# Patient Record
Sex: FEMALE | Race: WHITE | NOT HISPANIC OR LATINO | Employment: OTHER | URBAN - METROPOLITAN AREA
[De-identification: names, ages, dates, MRNs, and addresses within clinical notes are randomized per-mention and may not be internally consistent; named-entity substitution may affect disease eponyms.]

---

## 2023-02-18 PROBLEM — N90.9 NONINFLAMMATORY DISORDER OF VULVA OR PERINEUM: Status: ACTIVE | Noted: 2020-09-29

## 2023-02-18 PROBLEM — R73.01 IMPAIRED FASTING GLUCOSE: Status: ACTIVE | Noted: 2020-03-30

## 2023-02-18 PROBLEM — R51.9 HEADACHE: Status: ACTIVE | Noted: 2021-03-24

## 2023-02-18 PROBLEM — E66.01 MORBID OBESITY (MULTI): Status: ACTIVE | Noted: 2019-04-04

## 2023-02-18 PROBLEM — M19.91 LOCALIZED, PRIMARY OSTEOARTHRITIS: Status: ACTIVE | Noted: 2019-04-04

## 2023-02-18 PROBLEM — E05.90 THYROTOXICOSIS: Status: ACTIVE | Noted: 2019-04-04

## 2023-02-18 PROBLEM — K21.9 GASTROESOPHAGEAL REFLUX DISEASE: Status: ACTIVE | Noted: 2019-04-04

## 2023-02-18 PROBLEM — B35.6 TINEA CRURIS: Status: ACTIVE | Noted: 2020-09-29

## 2023-02-18 RX ORDER — BETAMETHASONE VALERATE 1 MG/G
CREAM TOPICAL DAILY PRN
COMMUNITY
Start: 2019-10-03

## 2023-02-18 RX ORDER — FAMOTIDINE 40 MG/1
1 TABLET, FILM COATED ORAL DAILY
COMMUNITY
Start: 2014-09-08 | End: 2023-03-21 | Stop reason: SDUPTHER

## 2023-02-18 RX ORDER — ASPIRIN 81 MG/1
1 TABLET ORAL DAILY
COMMUNITY
Start: 2020-09-29

## 2023-02-18 RX ORDER — LISINOPRIL 40 MG/1
1 TABLET ORAL DAILY
COMMUNITY
Start: 2006-03-01 | End: 2023-03-21 | Stop reason: SDUPTHER

## 2023-02-18 RX ORDER — POTASSIUM CHLORIDE 1500 MG/1
1 TABLET, EXTENDED RELEASE ORAL 2 TIMES DAILY
COMMUNITY
Start: 2019-10-03 | End: 2023-03-21 | Stop reason: SDUPTHER

## 2023-02-18 RX ORDER — CICLOPIROX OLAMINE 7.7 MG/G
CREAM TOPICAL 2 TIMES DAILY
COMMUNITY
Start: 2020-09-29 | End: 2023-03-21 | Stop reason: WASHOUT

## 2023-02-18 RX ORDER — METHIMAZOLE 10 MG/1
0.5 TABLET ORAL
COMMUNITY
Start: 2013-10-08 | End: 2023-03-21 | Stop reason: SDUPTHER

## 2023-02-18 RX ORDER — NYSTATIN 100000 [USP'U]/G
POWDER TOPICAL 3 TIMES DAILY
COMMUNITY
Start: 2017-10-09 | End: 2023-03-21 | Stop reason: WASHOUT

## 2023-02-18 RX ORDER — TOLNAFTATE 10 MG/G
CREAM TOPICAL 2 TIMES DAILY
COMMUNITY
Start: 2020-10-28 | End: 2023-03-21 | Stop reason: WASHOUT

## 2023-02-18 RX ORDER — HYDROCHLOROTHIAZIDE 25 MG/1
2 TABLET ORAL DAILY
COMMUNITY
Start: 2006-03-01 | End: 2023-03-21 | Stop reason: SDUPTHER

## 2023-02-18 RX ORDER — CYANOCOBALAMIN (VITAMIN B-12) 500 MCG
1 TABLET ORAL DAILY
COMMUNITY
Start: 2010-10-04

## 2023-02-18 RX ORDER — FLAXSEED
POWDER (GRAM) ORAL DAILY
COMMUNITY
Start: 2016-03-21 | End: 2023-03-21 | Stop reason: WASHOUT

## 2023-03-10 ENCOUNTER — TELEPHONE (OUTPATIENT)
Dept: PRIMARY CARE | Facility: CLINIC | Age: 83
End: 2023-03-10
Payer: MEDICARE

## 2023-03-10 DIAGNOSIS — E78.2 MIXED HYPERLIPIDEMIA: Primary | ICD-10-CM

## 2023-03-10 DIAGNOSIS — I10 ESSENTIAL HYPERTENSION: ICD-10-CM

## 2023-03-10 DIAGNOSIS — R73.01 IMPAIRED FASTING GLUCOSE: ICD-10-CM

## 2023-03-10 NOTE — TELEPHONE ENCOUNTER
Pt states her and her  would like to go have their routine BW done on Monday but there are no orders in Epic or [employer]. Please place order in Epic and we can inform pt. Thanks, CG

## 2023-03-14 ENCOUNTER — LAB (OUTPATIENT)
Dept: LAB | Facility: LAB | Age: 83
End: 2023-03-14
Payer: MEDICARE

## 2023-03-14 DIAGNOSIS — R73.01 IMPAIRED FASTING GLUCOSE: ICD-10-CM

## 2023-03-14 DIAGNOSIS — E78.2 MIXED HYPERLIPIDEMIA: ICD-10-CM

## 2023-03-14 DIAGNOSIS — I10 ESSENTIAL HYPERTENSION: ICD-10-CM

## 2023-03-14 LAB
ANION GAP IN SER/PLAS: 11 MMOL/L (ref 10–20)
CALCIUM (MG/DL) IN SER/PLAS: 9.1 MG/DL (ref 8.6–10.3)
CARBON DIOXIDE, TOTAL (MMOL/L) IN SER/PLAS: 29 MMOL/L (ref 21–32)
CHLORIDE (MMOL/L) IN SER/PLAS: 105 MMOL/L (ref 98–107)
CHOLESTEROL (MG/DL) IN SER/PLAS: 167 MG/DL (ref 0–199)
CHOLESTEROL IN HDL (MG/DL) IN SER/PLAS: 51.7 MG/DL
CHOLESTEROL/HDL RATIO: 3.2
CREATININE (MG/DL) IN SER/PLAS: 0.96 MG/DL (ref 0.5–1.05)
GFR FEMALE: 59 ML/MIN/1.73M2
GLUCOSE (MG/DL) IN SER/PLAS: 88 MG/DL (ref 74–99)
LDL: 102 MG/DL (ref 0–99)
POTASSIUM (MMOL/L) IN SER/PLAS: 4.1 MMOL/L (ref 3.5–5.3)
SODIUM (MMOL/L) IN SER/PLAS: 141 MMOL/L (ref 136–145)
TRIGLYCERIDE (MG/DL) IN SER/PLAS: 69 MG/DL (ref 0–149)
UREA NITROGEN (MG/DL) IN SER/PLAS: 16 MG/DL (ref 6–23)
VLDL: 14 MG/DL (ref 0–40)

## 2023-03-14 PROCEDURE — 36415 COLL VENOUS BLD VENIPUNCTURE: CPT

## 2023-03-14 PROCEDURE — 83036 HEMOGLOBIN GLYCOSYLATED A1C: CPT

## 2023-03-14 PROCEDURE — 80061 LIPID PANEL: CPT

## 2023-03-14 PROCEDURE — 80048 BASIC METABOLIC PNL TOTAL CA: CPT

## 2023-03-15 LAB
ESTIMATED AVERAGE GLUCOSE FOR HBA1C: 114 MG/DL
HEMOGLOBIN A1C/HEMOGLOBIN TOTAL IN BLOOD: 5.6 %

## 2023-03-21 ENCOUNTER — OFFICE VISIT (OUTPATIENT)
Dept: PRIMARY CARE | Facility: CLINIC | Age: 83
End: 2023-03-21
Payer: MEDICARE

## 2023-03-21 VITALS
WEIGHT: 201 LBS | SYSTOLIC BLOOD PRESSURE: 126 MMHG | TEMPERATURE: 97.8 F | DIASTOLIC BLOOD PRESSURE: 84 MMHG | OXYGEN SATURATION: 96 % | HEART RATE: 67 BPM

## 2023-03-21 DIAGNOSIS — K21.9 GASTROESOPHAGEAL REFLUX DISEASE WITHOUT ESOPHAGITIS: Primary | ICD-10-CM

## 2023-03-21 DIAGNOSIS — I10 BENIGN ESSENTIAL HYPERTENSION: ICD-10-CM

## 2023-03-21 DIAGNOSIS — E05.90 THYROTOXICOSIS WITHOUT THYROID STORM, UNSPECIFIED THYROTOXICOSIS TYPE: ICD-10-CM

## 2023-03-21 PROCEDURE — 1036F TOBACCO NON-USER: CPT | Performed by: FAMILY MEDICINE

## 2023-03-21 PROCEDURE — 3079F DIAST BP 80-89 MM HG: CPT | Performed by: FAMILY MEDICINE

## 2023-03-21 PROCEDURE — 3074F SYST BP LT 130 MM HG: CPT | Performed by: FAMILY MEDICINE

## 2023-03-21 PROCEDURE — 1159F MED LIST DOCD IN RCRD: CPT | Performed by: FAMILY MEDICINE

## 2023-03-21 PROCEDURE — 99214 OFFICE O/P EST MOD 30 MIN: CPT | Performed by: FAMILY MEDICINE

## 2023-03-21 RX ORDER — FAMOTIDINE 40 MG/1
40 TABLET, FILM COATED ORAL DAILY
Qty: 90 TABLET | Refills: 1 | Status: SHIPPED | OUTPATIENT
Start: 2023-03-21 | End: 2023-09-20 | Stop reason: SDUPTHER

## 2023-03-21 RX ORDER — METHIMAZOLE 10 MG/1
TABLET ORAL
Qty: 60 TABLET | Refills: 3 | Status: SHIPPED | OUTPATIENT
Start: 2023-03-21 | End: 2023-09-20 | Stop reason: SDUPTHER

## 2023-03-21 RX ORDER — HYDROCHLOROTHIAZIDE 25 MG/1
50 TABLET ORAL DAILY
Qty: 180 TABLET | Refills: 1 | Status: SHIPPED | OUTPATIENT
Start: 2023-03-21 | End: 2023-09-20 | Stop reason: SDUPTHER

## 2023-03-21 RX ORDER — LISINOPRIL 40 MG/1
40 TABLET ORAL DAILY
Qty: 90 TABLET | Refills: 1 | Status: SHIPPED | OUTPATIENT
Start: 2023-03-21 | End: 2023-09-20 | Stop reason: SDUPTHER

## 2023-03-21 RX ORDER — POTASSIUM CHLORIDE 1500 MG/1
20 TABLET, EXTENDED RELEASE ORAL 2 TIMES DAILY
Qty: 180 TABLET | Refills: 1 | Status: SHIPPED | OUTPATIENT
Start: 2023-03-21 | End: 2023-09-20 | Stop reason: SDUPTHER

## 2023-03-21 ASSESSMENT — PATIENT HEALTH QUESTIONNAIRE - PHQ9
2. FEELING DOWN, DEPRESSED OR HOPELESS: NOT AT ALL
SUM OF ALL RESPONSES TO PHQ9 QUESTIONS 1 AND 2: 0
1. LITTLE INTEREST OR PLEASURE IN DOING THINGS: NOT AT ALL

## 2023-03-21 NOTE — PATIENT INSTRUCTIONS
Chronic conditions controlled  Encouraged to continue eating healthy and exercising daily   Medications were reviewed and refilled   Discussed the following  Medicare visit in 1 month, labs done before 6-month appointment  Staying physically active, stay hydrated  Follow up in 1 month

## 2023-03-21 NOTE — PROGRESS NOTES
Assessment     ASSESSMENT/PLAN:      Problem List Items Addressed This Visit          Circulatory    Benign essential hypertension    Relevant Medications    hydroCHLOROthiazide (HYDRODiuril) 25 mg tablet    lisinopril 40 mg tablet    potassium chloride CR (K-Tab) 20 mEq ER tablet    Other Relevant Orders    Basic metabolic panel       Digestive    Gastroesophageal reflux disease - Primary    Relevant Medications    famotidine (Pepcid) 40 mg tablet       Endocrine/Metabolic    Thyrotoxicosis    Relevant Medications    methIMAzole (Tapazole) 10 mg tablet    Other Relevant Orders    Tsh With Reflex To Free T4 If Abnormal       Patient Instructions:  Patient Instructions   Chronic conditions controlled  Encouraged to continue eating healthy and exercising daily   Medications were reviewed and refilled   Discussed the following  Medicare visit in 1 month, labs done before 6-month appointment  Staying physically active  Follow up in 1 month      Signed by: Ysabel Funk DO       FUTURE DIRECTION:   Obesity: Encourage exercising  CKD stage IIb: Monitor kidney function, encourage hydration  Colitis: Continue diet modification, may need budesonide if having acute flare, may need to follow-up with GI specialist    Subjective   SUBJECTIVE:     HPI : Patient is a 82 y.o. female who presents today for the following:     HTN: Lisinopril 40 mg, chlorothiazide 25 mg  Hypokalemia: Potassium 20 mEq  Hypothyroidism: Methimazole 10 mg  DJD: Meloxicam 15 mg, Tylenol  GERD: Famotidine 40 mg  Colitis: has been seen by GI, improved with budesonide, trying to adhere intolerant to lactose  - direct controll    Preventative  Colonoscopy: Last colonoscopy January 2022, Dr Catherine, next one in 5 year.2027  Vaccine: Pneumovax 2009, Shingrix 2020, UTD on COVID  Bone density: Last DEXA scan 11/2020    Care team  Dermatology:   GI: Diane MEEKS    Chronic conditions controlled  Encouraged to continue eating healthy and exercising daily    Medications were reviewed and refilled   Discussed the following  Obesity: Encourage chair exercises, diet modifications  Follow up in 6 months       Review of Systems   All other systems reviewed and are negative.      No past medical history on file.     No past surgical history on file.     Current Outpatient Medications   Medication Instructions    aspirin 81 mg EC tablet 1 tablet, oral, Daily, Enteric    betamethasone valerate (Valisone) 0.1 % cream Topical, Daily PRN, Apply to affected area sparingly    cholecalciferol (Vitamin D-3) 10 MCG (400 UNIT) tablet 1 tablet, oral, Daily    famotidine (PEPCID) 40 mg, oral, Daily    hydroCHLOROthiazide (HYDRODIURIL) 50 mg, oral, Daily    lisinopril 40 mg, oral, Daily, (Patient will call when needed)    methIMAzole (Tapazole) 10 mg tablet Take half tab S,m,w,thurs,f<BR>Pt will call when needed    NON FORMULARY QC Tumeric Complex 500 MG oral capsules<BR>Take 2 pills in the morning and 2 pill in the evening    potassium chloride CR (K-Tab) 20 mEq ER tablet 20 mEq, oral, 2 times daily        No Known Allergies     Social History     Socioeconomic History    Marital status:      Spouse name: Not on file    Number of children: Not on file    Years of education: Not on file    Highest education level: Not on file   Occupational History    Not on file   Tobacco Use    Smoking status: Never    Smokeless tobacco: Never   Vaping Use    Vaping status: Not on file   Substance and Sexual Activity    Alcohol use: Not on file    Drug use: Not on file    Sexual activity: Not on file   Other Topics Concern    Not on file   Social History Narrative    Not on file     Social Determinants of Health     Financial Resource Strain: Not on file   Food Insecurity: Not on file   Transportation Needs: Not on file   Physical Activity: Not on file   Stress: Not on file   Social Connections: Not on file   Intimate Partner Violence: Not on file   Housing Stability: Not on file        Family  History   Problem Relation Name Age of Onset    Hypertension Mother      Liver cancer Father      Liver disease Father      Coronary artery disease Brother          CABG 55    Liver disease Son      Coronary artery disease Paternal Grandfather          MI 47        Objective     OBJECTIVE:     Vitals:    03/21/23 0802   BP: 126/84   Pulse: 67   Temp: 36.6 °C (97.8 °F)   SpO2: 96%   Weight: 91.2 kg (201 lb)        Physical Exam  HENT:      Head: Normocephalic and atraumatic.      Nose: Nose normal.      Mouth/Throat:      Mouth: Mucous membranes are moist.   Eyes:      Pupils: Pupils are equal, round, and reactive to light.   Cardiovascular:      Rate and Rhythm: Normal rate and regular rhythm.      Pulses: Normal pulses.      Heart sounds: No murmur heard.  Pulmonary:      Effort: Pulmonary effort is normal.      Breath sounds: Normal breath sounds. No wheezing.   Abdominal:      Tenderness: There is no abdominal tenderness.   Musculoskeletal:         General: Normal range of motion.      Cervical back: Normal range of motion.   Skin:     General: Skin is warm and dry.   Neurological:      Mental Status: She is alert.   Psychiatric:         Mood and Affect: Mood normal.

## 2023-05-16 ENCOUNTER — OFFICE VISIT (OUTPATIENT)
Dept: PRIMARY CARE | Facility: CLINIC | Age: 83
End: 2023-05-16
Payer: MEDICARE

## 2023-05-16 VITALS
HEIGHT: 60 IN | TEMPERATURE: 97.9 F | OXYGEN SATURATION: 98 % | HEART RATE: 67 BPM | WEIGHT: 205 LBS | BODY MASS INDEX: 40.25 KG/M2 | SYSTOLIC BLOOD PRESSURE: 112 MMHG | DIASTOLIC BLOOD PRESSURE: 68 MMHG

## 2023-05-16 DIAGNOSIS — Z13.89 ENCOUNTER FOR SCREENING FOR OTHER DISORDER: ICD-10-CM

## 2023-05-16 DIAGNOSIS — Z78.0 ENCOUNTER FOR OSTEOPOROSIS SCREENING IN ASYMPTOMATIC POSTMENOPAUSAL PATIENT: ICD-10-CM

## 2023-05-16 DIAGNOSIS — Z00.00 ENCOUNTER FOR ANNUAL WELLNESS EXAM IN MEDICARE PATIENT: Primary | ICD-10-CM

## 2023-05-16 DIAGNOSIS — Z13.820 SCREENING FOR OSTEOPOROSIS: ICD-10-CM

## 2023-05-16 DIAGNOSIS — E78.2 MIXED HYPERLIPIDEMIA: ICD-10-CM

## 2023-05-16 DIAGNOSIS — Z13.820 ENCOUNTER FOR OSTEOPOROSIS SCREENING IN ASYMPTOMATIC POSTMENOPAUSAL PATIENT: ICD-10-CM

## 2023-05-16 PROCEDURE — G0439 PPPS, SUBSEQ VISIT: HCPCS | Performed by: FAMILY MEDICINE

## 2023-05-16 PROCEDURE — 3078F DIAST BP <80 MM HG: CPT | Performed by: FAMILY MEDICINE

## 2023-05-16 PROCEDURE — 1170F FXNL STATUS ASSESSED: CPT | Performed by: FAMILY MEDICINE

## 2023-05-16 PROCEDURE — G0444 DEPRESSION SCREEN ANNUAL: HCPCS | Performed by: FAMILY MEDICINE

## 2023-05-16 PROCEDURE — G0446 INTENS BEHAVE THER CARDIO DX: HCPCS | Performed by: FAMILY MEDICINE

## 2023-05-16 PROCEDURE — 3074F SYST BP LT 130 MM HG: CPT | Performed by: FAMILY MEDICINE

## 2023-05-16 PROCEDURE — 1160F RVW MEDS BY RX/DR IN RCRD: CPT | Performed by: FAMILY MEDICINE

## 2023-05-16 PROCEDURE — 1036F TOBACCO NON-USER: CPT | Performed by: FAMILY MEDICINE

## 2023-05-16 PROCEDURE — 1159F MED LIST DOCD IN RCRD: CPT | Performed by: FAMILY MEDICINE

## 2023-05-16 RX ORDER — UBIDECARENONE 75 MG
500 CAPSULE ORAL DAILY
COMMUNITY

## 2023-05-16 ASSESSMENT — MINI MENTAL STATE EXAM
SHOW: PENCIL [OBJECT] ASK: WHAT IS THIS CALLED?: 2 CORRECT
SPELL THE WORD WORLD FORWARD AND BACKWARDS OR SERIAL 7S: 5 CORRECT
RECALL THE 3 OBJECTS FROM ABOVE (APPLE, TABLE, PENNY) OR (BALL, TREE, FLAG): 2 CORRECT
WHAT STATE, COUNTRY, CITY, HOSPITAL, FLOOR: 5 CORRECT
NAME OR REPEAT 3 OBJECTS - (APPLE, TABLE, PENNY) OR (BALL, TREE, FLAG): 3 CORRECT
SAY: I WOULD LIKE YOU TO REPEAT THIS PHRASE AFTER ME: NO IFS, ANDS, OR BUTS.: 1 CORRECT
HAND THE PERSON A PENCIL AND PAPER. SAY:  WRITE ANY COMPLETE SENTENCE ON THAT PIECE OF PAPER. (NOTE: THE SENTENCE MUST MAKE SENSE.  IGNORE SPELLING ERRORS): 1 CORRECT
SUM ALL MMSE QUESTIONS FOR TOTAL SCORE [OUT OF 30].: 29
SAY:  READ THE WORDS ON THE PAGE AND THEN DO WHAT IT SAYS.  THEN HAND THE PERSON THE SHEET WITH CLOSE YOUR EYES ON IT.  IF THE SUBJECT READS AND DOES NOT CLOSE THEIR EYES, REPEAT UP TO THREE TIMES.  SCORE ONLY IF SUBJECT CLOSES EYES.: 3 CORRECT
WHAT IS THE YEAR, SEASON, DATE, DAY, AND MONTH: 5 CORRECT
PLEASE COPY THIS PICTURE (NOTE ALL 10 ANGLES MUST BE PRESENT AND TWO MUST INTERSECT): 1 CORRECT
PLACE DESIGN, ERASER AND PENCIL IN FRONT OF THE PERSON.  SAY:  COPY THIS DESIGN PLEASE.  SHOW: DESIGN. ALLOW: MULTIPLE TRIES. WAIT UNTIL PERSON IS FINISHED AND HANDS IT BACK. SCORE: ONLY FOR DIAGRAM WITH 4-SIDED FIGURE BETWEEN TWO 5-SIDED FIGURES: 1 CORRECT

## 2023-05-16 ASSESSMENT — ACTIVITIES OF DAILY LIVING (ADL)
GROCERY_SHOPPING: INDEPENDENT
BATHING: INDEPENDENT
DRESSING: INDEPENDENT
TAKING_MEDICATION: INDEPENDENT
DOING_HOUSEWORK: NEEDS ASSISTANCE
MANAGING_FINANCES: INDEPENDENT

## 2023-05-16 ASSESSMENT — PATIENT HEALTH QUESTIONNAIRE - PHQ9
SUM OF ALL RESPONSES TO PHQ9 QUESTIONS 1 AND 2: 0
1. LITTLE INTEREST OR PLEASURE IN DOING THINGS: NOT AT ALL
1. LITTLE INTEREST OR PLEASURE IN DOING THINGS: NOT AT ALL
2. FEELING DOWN, DEPRESSED OR HOPELESS: NOT AT ALL
SUM OF ALL RESPONSES TO PHQ9 QUESTIONS 1 AND 2: 0
2. FEELING DOWN, DEPRESSED OR HOPELESS: NOT AT ALL

## 2023-05-16 NOTE — PROGRESS NOTES
"   05/16/23 0900   MMSE (Mini Mental State Exam)   What is the Year, Season, Date, Day, and Month? 5   Where are we: State, Country, City, Hospital, Floor? 5   Name / Repeat 3 objects:( Apple, Table, Geno) or (Ball, Tree, Flag)  3   Serial 7's backwards or spell World backwards? 5   Recall the 3 objects from above (apple, table, geno) or (ball, tree, flag) 2   Name the following: pencil, watch 2   Repeat the following: \"No ifs, ands, or buts.\" 1   Follow these commands: Take a paper in your right hand, fold it in half, and put it on the floor. 3   \"Please read this and do what it says\" (Written instruction is \"Close your eyes.\") 1   Written instruction is \"Make up and write a sentence about anything.\" (This sentence must contain a noun and a verb.) 1   Written instruction is: \"Please copy this picture.\" (All 10 angles must be present and two must intersect.) 1   Total Score: 29     "

## 2023-05-16 NOTE — PATIENT INSTRUCTIONS
Chronic conditions controlled  Encouraged to continue eating healthy and exercising daily   Medications were reviewed and refilled   Discussed the following  Follow-up with dermatology for annual skin exam  Follow up in 6 months

## 2023-05-16 NOTE — PROGRESS NOTES
Assessment/Plan    ASSESSMENT/PLAN:      Problem List Items Addressed This Visit          Other    Hyperlipidemia    Overview     Note: [facility]         Relevant Orders    Lipid panel     Other Visit Diagnoses       Encounter for annual wellness exam in Medicare patient    -  Primary    Screening for osteoporosis        Relevant Orders    XR DEXA bone density    Encounter for osteoporosis screening in asymptomatic postmenopausal patient        Relevant Orders    XR DEXA bone density    Encounter for screening for other disorder [Z13.89 (ICD-10-CM)]                Patient Instructions:   Patient Instructions   Chronic conditions controlled  Encouraged to continue eating healthy and exercising daily   Medications were reviewed and refilled   Discussed the following  Follow-up with dermatology for annual skin exam  Follow up in 6 months      FUTURE DIRECTION:       Subjective   SUBJECTIVE:     Reason for Visit: Amena Rubio is an 82 y.o. female here for a Medicare Wellness visit.     HTN: Lisinopril 40 mg, chlorothiazide 25 mg  Hypokalemia: Potassium 20 mEq  Hypothyroidism: Methimazole 10 mg  DJD: Meloxicam 15 mg, Tylenol  GERD: Famotidine 40 mg  Colitis: has been seen by GI, improved with budesonide, trying to adhere intolerant to lactose    Preventative  Colonoscopy: Last colonoscopy January 2022, Dr Catherine, next one in 5 year.2027  Vaccine: Pneumovax 2009, Shingrix 2020, UTD on COVID  Bone density: Last DEXA scan 11/2020    Care team  Dermatology:   GI: Diane MEEKS  Past Medical, Surgical, and Family History reviewed and updated in chart.    Reviewed all medications by prescribing practitioner or clinical pharmacist (such as prescriptions, OTCs, herbal therapies and supplements) and documented in the medical record.      Patient Care Team:  Ysabel Funk DO as PCP - General  Ysabel Funk DO as PCP - [employer] PCP     Review of Systems    Objective   OBJECTIVE:     Vitals:  BP  112/68   Pulse 67   Temp 36.6 °C (97.9 °F) (Temporal)   Ht 1.524 m (5')   Wt 93 kg (205 lb)   SpO2 98%   BMI 40.04 kg/m²       Physical Exam                Cardiovascular risk discussed and, if needed, lifestyle modifications recommended, including nutritional choices, exercise, and elimination of habits contributing to risk.  We agreed on a plan to reduce her current cardiovascular risk.   Aspirin use/disuse was discussed after reviewing updated guidelines. Spent 15 minutes

## 2023-06-20 ENCOUNTER — TELEPHONE (OUTPATIENT)
Dept: PRIMARY CARE | Facility: CLINIC | Age: 83
End: 2023-06-20
Payer: MEDICARE

## 2023-06-23 NOTE — TELEPHONE ENCOUNTER
----- Message from Ysabel Funk DO sent at 6/23/2023 11:22 AM EDT -----  Please let pt know that DEXA scan was normal

## 2023-08-09 ENCOUNTER — OFFICE VISIT (OUTPATIENT)
Dept: PRIMARY CARE | Facility: CLINIC | Age: 83
End: 2023-08-09
Payer: MEDICARE

## 2023-08-09 VITALS
OXYGEN SATURATION: 96 % | WEIGHT: 199 LBS | DIASTOLIC BLOOD PRESSURE: 76 MMHG | SYSTOLIC BLOOD PRESSURE: 122 MMHG | TEMPERATURE: 97 F | HEART RATE: 71 BPM | BODY MASS INDEX: 38.86 KG/M2

## 2023-08-09 DIAGNOSIS — K59.09 OTHER CONSTIPATION: Primary | ICD-10-CM

## 2023-08-09 PROCEDURE — 1160F RVW MEDS BY RX/DR IN RCRD: CPT | Performed by: FAMILY MEDICINE

## 2023-08-09 PROCEDURE — 3078F DIAST BP <80 MM HG: CPT | Performed by: FAMILY MEDICINE

## 2023-08-09 PROCEDURE — 99213 OFFICE O/P EST LOW 20 MIN: CPT | Performed by: FAMILY MEDICINE

## 2023-08-09 PROCEDURE — 3074F SYST BP LT 130 MM HG: CPT | Performed by: FAMILY MEDICINE

## 2023-08-09 PROCEDURE — 1159F MED LIST DOCD IN RCRD: CPT | Performed by: FAMILY MEDICINE

## 2023-08-09 PROCEDURE — 1036F TOBACCO NON-USER: CPT | Performed by: FAMILY MEDICINE

## 2023-08-09 RX ORDER — POLYETHYLENE GLYCOL 3350 17 G/17G
17 POWDER, FOR SOLUTION ORAL DAILY
Qty: 30 PACKET | Refills: 0 | Status: SHIPPED | OUTPATIENT
Start: 2023-08-09 | End: 2023-09-08

## 2023-08-09 ASSESSMENT — ENCOUNTER SYMPTOMS: CONSTIPATION: 1

## 2023-08-09 NOTE — PATIENT INSTRUCTIONS
Constipation: Patient advised to try MiraLAX 17 g in the morning and evening until stool is soft.  If no improvement between days 4 and 5 patient advised to increase MiraLAX to 17 g 3 times a day.

## 2023-08-09 NOTE — PROGRESS NOTES
Assessment/Plan   ASSESSMENT/PLAN:      Amena was seen today for constipation.  Diagnoses and all orders for this visit:  Other constipation (Primary)  -     polyethylene glycol (Glycolax, Miralax) 17 gram packet; Take 17 g by mouth once daily. Mix 1 cap (17g) into 8 ounces of fluid.       Patient Instructions   Constipation: Patient advised to try MiraLAX 17 g in the morning and evening until stool is soft.  If no improvement between days 4 and 5 patient advised to increase MiraLAX to 17 g 3 times a day.    Ysabel Funk DO     FUTURE DIRECTION:     Subjective   SUBJECTIVE:     Patient ID: Amena Rubio is a 83 y.o. femalefor the following:    Constipation  Patient states that she has a history of colitis and usually experiences diarrhea but afterwards experiences constipation.  She denies abdominal pain nausea  Her last bowel movement was today but it was hard and small amount which caused increased straining      Review of Systems   Gastrointestinal:  Positive for constipation.       No Known Allergies      Current Outpatient Medications:     aspirin 81 mg EC tablet, Take 1 tablet (81 mg) by mouth once daily. Enteric, Disp: , Rfl:     betamethasone valerate (Valisone) 0.1 % cream, Apply topically once daily as needed. Apply to affected area sparingly, Disp: , Rfl:     cholecalciferol (Vitamin D-3) 10 MCG (400 UNIT) tablet, Take 1 tablet (10 mcg) by mouth once daily., Disp: , Rfl:     cyanocobalamin (Vitamin B-12) 500 mcg tablet, Take 1 tablet (500 mcg) by mouth once daily., Disp: , Rfl:     famotidine (Pepcid) 40 mg tablet, Take 1 tablet (40 mg) by mouth once daily., Disp: 90 tablet, Rfl: 1    hydroCHLOROthiazide (HYDRODiuril) 25 mg tablet, Take 2 tablets (50 mg) by mouth once daily., Disp: 180 tablet, Rfl: 1    lisinopril 40 mg tablet, Take 1 tablet (40 mg) by mouth once daily. (Patient will call when needed), Disp: 90 tablet, Rfl: 1    methIMAzole (Tapazole) 10 mg tablet, Take half tab S,m,w,thurs,f  Pt will call when needed, Disp: 60 tablet, Rfl: 3    NON FORMULARY, QC Tumeric Complex 500 MG oral capsules Take 2 pills in the morning and 2 pill in the evening, Disp: , Rfl:     potassium chloride CR (K-Tab) 20 mEq ER tablet, Take 1 tablet (20 mEq) by mouth in the morning and 1 tablet (20 mEq) before bedtime., Disp: 180 tablet, Rfl: 1    polyethylene glycol (Glycolax, Miralax) 17 gram packet, Take 17 g by mouth once daily. Mix 1 cap (17g) into 8 ounces of fluid., Disp: 30 packet, Rfl: 0     Patient Active Problem List   Diagnosis    Benign essential hypertension    Hyperlipidemia    Essential hypertension    Localized, primary osteoarthritis    Thyrotoxicosis    Gastroesophageal reflux disease    Morbid obesity (CMS/HCC)    Impaired fasting glucose    Tinea cruris    Noninflammatory disorder of vulva or perineum    Headache       Social History     Socioeconomic History    Marital status:      Spouse name: Not on file    Number of children: Not on file    Years of education: Not on file    Highest education level: Not on file   Occupational History    Not on file   Tobacco Use    Smoking status: Never    Smokeless tobacco: Never   Substance and Sexual Activity    Alcohol use: Not on file    Drug use: Not on file    Sexual activity: Not on file   Other Topics Concern    Not on file   Social History Narrative    Not on file     Social Determinants of Health     Financial Resource Strain: Not on file   Food Insecurity: Not on file   Transportation Needs: Not on file   Physical Activity: Not on file   Stress: Not on file   Social Connections: Not on file   Intimate Partner Violence: Not on file   Housing Stability: Not on file       Objective   OBJECTIVE:     Vitals:    08/09/23 1544   BP: 122/76   Pulse: 71   Temp: 36.1 °C (97 °F)   SpO2: 96%       Exam      Physical Exam  Constitutional:       Appearance: Normal appearance.   HENT:      Head: Normocephalic.   Pulmonary:      Effort: Pulmonary effort is  normal.   Abdominal:      Tenderness: There is no abdominal tenderness.   Musculoskeletal:      Cervical back: Normal range of motion.   Neurological:      Mental Status: She is alert.   Psychiatric:         Mood and Affect: Mood normal.

## 2023-08-28 DIAGNOSIS — I10 BENIGN ESSENTIAL HYPERTENSION: ICD-10-CM

## 2023-08-28 DIAGNOSIS — K21.9 GASTROESOPHAGEAL REFLUX DISEASE WITHOUT ESOPHAGITIS: ICD-10-CM

## 2023-08-29 RX ORDER — LISINOPRIL 40 MG/1
40 TABLET ORAL
Qty: 90 TABLET | Refills: 1 | OUTPATIENT
Start: 2023-08-29

## 2023-08-29 RX ORDER — HYDROCHLOROTHIAZIDE 25 MG/1
50 TABLET ORAL DAILY
Qty: 180 TABLET | Refills: 1 | OUTPATIENT
Start: 2023-08-29

## 2023-08-29 RX ORDER — FAMOTIDINE 40 MG/1
40 TABLET, FILM COATED ORAL DAILY
Qty: 90 TABLET | Refills: 1 | OUTPATIENT
Start: 2023-08-29

## 2023-08-31 RX ORDER — POTASSIUM CHLORIDE 20 MEQ/1
20 TABLET, EXTENDED RELEASE ORAL 2 TIMES DAILY
Qty: 180 TABLET | OUTPATIENT
Start: 2023-08-31

## 2023-09-11 ENCOUNTER — LAB (OUTPATIENT)
Dept: LAB | Facility: LAB | Age: 83
End: 2023-09-11
Payer: MEDICARE

## 2023-09-11 DIAGNOSIS — E05.90 THYROTOXICOSIS WITHOUT THYROID STORM, UNSPECIFIED THYROTOXICOSIS TYPE: ICD-10-CM

## 2023-09-11 DIAGNOSIS — I10 BENIGN ESSENTIAL HYPERTENSION: ICD-10-CM

## 2023-09-11 DIAGNOSIS — E78.2 MIXED HYPERLIPIDEMIA: Primary | ICD-10-CM

## 2023-09-11 DIAGNOSIS — E78.2 MIXED HYPERLIPIDEMIA: ICD-10-CM

## 2023-09-11 LAB
ANION GAP IN SER/PLAS: 10 MMOL/L (ref 10–20)
CALCIUM (MG/DL) IN SER/PLAS: 9.1 MG/DL (ref 8.6–10.3)
CARBON DIOXIDE, TOTAL (MMOL/L) IN SER/PLAS: 29 MMOL/L (ref 21–32)
CHLORIDE (MMOL/L) IN SER/PLAS: 106 MMOL/L (ref 98–107)
CHOLESTEROL (MG/DL) IN SER/PLAS: 172 MG/DL (ref 0–199)
CHOLESTEROL IN HDL (MG/DL) IN SER/PLAS: 51.4 MG/DL
CHOLESTEROL/HDL RATIO: 3.3
CREATININE (MG/DL) IN SER/PLAS: 0.78 MG/DL (ref 0.5–1.05)
GFR FEMALE: 76 ML/MIN/1.73M2
GLUCOSE (MG/DL) IN SER/PLAS: 77 MG/DL (ref 74–99)
LDL: 101 MG/DL (ref 0–99)
POTASSIUM (MMOL/L) IN SER/PLAS: 4.2 MMOL/L (ref 3.5–5.3)
SODIUM (MMOL/L) IN SER/PLAS: 141 MMOL/L (ref 136–145)
THYROTROPIN (MIU/L) IN SER/PLAS BY DETECTION LIMIT <= 0.05 MIU/L: 0.15 MIU/L (ref 0.44–3.98)
TRIGLYCERIDE (MG/DL) IN SER/PLAS: 100 MG/DL (ref 0–149)
UREA NITROGEN (MG/DL) IN SER/PLAS: 18 MG/DL (ref 6–23)
VLDL: 20 MG/DL (ref 0–40)

## 2023-09-11 PROCEDURE — 84439 ASSAY OF FREE THYROXINE: CPT

## 2023-09-11 PROCEDURE — 36415 COLL VENOUS BLD VENIPUNCTURE: CPT

## 2023-09-11 PROCEDURE — 80061 LIPID PANEL: CPT

## 2023-09-11 PROCEDURE — 80048 BASIC METABOLIC PNL TOTAL CA: CPT

## 2023-09-11 PROCEDURE — 84443 ASSAY THYROID STIM HORMONE: CPT

## 2023-09-12 LAB — THYROXINE (T4) FREE (NG/DL) IN SER/PLAS: 1.42 NG/DL (ref 0.61–1.12)

## 2023-09-20 ENCOUNTER — OFFICE VISIT (OUTPATIENT)
Dept: PRIMARY CARE | Facility: CLINIC | Age: 83
End: 2023-09-20
Payer: MEDICARE

## 2023-09-20 VITALS
OXYGEN SATURATION: 98 % | TEMPERATURE: 98.3 F | SYSTOLIC BLOOD PRESSURE: 104 MMHG | HEART RATE: 66 BPM | DIASTOLIC BLOOD PRESSURE: 60 MMHG | WEIGHT: 201 LBS | BODY MASS INDEX: 39.26 KG/M2

## 2023-09-20 DIAGNOSIS — Z23 FLU VACCINE NEED: Primary | ICD-10-CM

## 2023-09-20 DIAGNOSIS — E05.90 THYROTOXICOSIS WITHOUT THYROID STORM, UNSPECIFIED THYROTOXICOSIS TYPE: ICD-10-CM

## 2023-09-20 DIAGNOSIS — E78.2 MIXED HYPERLIPIDEMIA: ICD-10-CM

## 2023-09-20 DIAGNOSIS — I10 BENIGN ESSENTIAL HYPERTENSION: ICD-10-CM

## 2023-09-20 DIAGNOSIS — K21.9 GASTROESOPHAGEAL REFLUX DISEASE WITHOUT ESOPHAGITIS: ICD-10-CM

## 2023-09-20 DIAGNOSIS — K59.04 CHRONIC IDIOPATHIC CONSTIPATION: ICD-10-CM

## 2023-09-20 PROCEDURE — 1159F MED LIST DOCD IN RCRD: CPT | Performed by: FAMILY MEDICINE

## 2023-09-20 PROCEDURE — 3078F DIAST BP <80 MM HG: CPT | Performed by: FAMILY MEDICINE

## 2023-09-20 PROCEDURE — 1160F RVW MEDS BY RX/DR IN RCRD: CPT | Performed by: FAMILY MEDICINE

## 2023-09-20 PROCEDURE — G0008 ADMIN INFLUENZA VIRUS VAC: HCPCS | Performed by: FAMILY MEDICINE

## 2023-09-20 PROCEDURE — 90662 IIV NO PRSV INCREASED AG IM: CPT | Performed by: FAMILY MEDICINE

## 2023-09-20 PROCEDURE — 99214 OFFICE O/P EST MOD 30 MIN: CPT | Performed by: FAMILY MEDICINE

## 2023-09-20 PROCEDURE — 3074F SYST BP LT 130 MM HG: CPT | Performed by: FAMILY MEDICINE

## 2023-09-20 PROCEDURE — 1036F TOBACCO NON-USER: CPT | Performed by: FAMILY MEDICINE

## 2023-09-20 RX ORDER — FAMOTIDINE 40 MG/1
40 TABLET, FILM COATED ORAL DAILY
Qty: 90 TABLET | Refills: 1 | Status: SHIPPED | OUTPATIENT
Start: 2023-09-20 | End: 2024-03-19 | Stop reason: SDUPTHER

## 2023-09-20 RX ORDER — METHIMAZOLE 10 MG/1
TABLET ORAL
Qty: 90 TABLET | Refills: 1 | Status: SHIPPED | OUTPATIENT
Start: 2023-09-20

## 2023-09-20 RX ORDER — LISINOPRIL 40 MG/1
40 TABLET ORAL DAILY
Qty: 90 TABLET | Refills: 1 | Status: SHIPPED | OUTPATIENT
Start: 2023-09-20 | End: 2024-03-19 | Stop reason: SDUPTHER

## 2023-09-20 RX ORDER — HYDROCHLOROTHIAZIDE 25 MG/1
50 TABLET ORAL DAILY
Qty: 180 TABLET | Refills: 1 | Status: SHIPPED | OUTPATIENT
Start: 2023-09-20 | End: 2024-03-19 | Stop reason: SDUPTHER

## 2023-09-20 RX ORDER — DOCUSATE SODIUM 100 MG/1
100 CAPSULE, LIQUID FILLED ORAL 2 TIMES DAILY PRN
Qty: 180 CAPSULE | Refills: 1 | Status: SHIPPED | OUTPATIENT
Start: 2023-09-20 | End: 2024-03-18

## 2023-09-20 RX ORDER — POTASSIUM CHLORIDE 20 MEQ/1
20 TABLET, EXTENDED RELEASE ORAL 2 TIMES DAILY
Qty: 180 TABLET | Refills: 1 | Status: SHIPPED | OUTPATIENT
Start: 2023-09-20 | End: 2024-03-19 | Stop reason: SDUPTHER

## 2023-09-20 ASSESSMENT — ENCOUNTER SYMPTOMS: COLOR CHANGE: 1

## 2023-09-20 NOTE — PROGRESS NOTES
Assessment     ASSESSMENT/PLAN:      Problem List Items Addressed This Visit       Benign essential hypertension    Relevant Medications    lisinopril 40 mg tablet    hydroCHLOROthiazide (HYDRODiuril) 25 mg tablet    potassium chloride CR 20 mEq ER tablet    Other Relevant Orders    Basic Metabolic Panel    Hyperlipidemia    Relevant Orders    Lipid Panel    Thyrotoxicosis    Relevant Medications    methIMAzole (Tapazole) 10 mg tablet    Other Relevant Orders    Tsh With Reflex To Free T4 If Abnormal    Tsh With Reflex To Free T4 If Abnormal    Gastroesophageal reflux disease    Relevant Medications    famotidine (Pepcid) 40 mg tablet     Other Visit Diagnoses       Flu vaccine need    -  Primary    Relevant Orders    Flu vaccine, quadrivalent, high-dose, preservative free, age 65y+ (FLUZONE) (Completed)    Chronic idiopathic constipation        Relevant Medications    docusate sodium (Colace) 100 mg capsule            Patient Instructions:  Patient Instructions   Thyrotoxicosis: Continue with them as all 10 mg, will recheck TSH in 6 weeks, if still elevated plan is to refer patient to endocrinology  Constipation: Try docusate twice a day as needed  Preventative: Provided flu vaccine      Signed by: Ysabel Funk DO       FUTURE DIRECTION:   none    Subjective   SUBJECTIVE:     HPI : Patient is a 83 y.o. female who presents today for the following:     HTN:   - Controlled with lisinopril 40 mg, HCTZ 25 mg daily    Hypokalemia:   - Controlled with potassium 20 mEq    Hypothyroidism:   -Controlled with methimazole 10 mg    DJD:   - Controlled with Tylenol l    GERD:   -Controlled with famotidine 40 mg    Colitis:   - has been seen by GI, improved with budesonide, trying to adhere intolerant to lactose    Preventative  Colonoscopy: Last colonoscopy January 2022, Dr Catherine, next one in 5 year.2027  Vaccine: Pneumovax 2009, Shingrix 2020, UTD on COVID  Bone density: Last DEXA scan 5/2023 is nml      Care  team  Dermatology:   GI: Diane MEEKS    Review of Systems   Skin:  Positive for color change.       History reviewed. No pertinent past medical history.     History reviewed. No pertinent surgical history.     Current Outpatient Medications   Medication Instructions    aspirin 81 mg EC tablet 1 tablet, oral, Daily, Enteric    betamethasone valerate (Valisone) 0.1 % cream Topical, Daily PRN, Apply to affected area sparingly    cholecalciferol (Vitamin D-3) 10 MCG (400 UNIT) tablet 1 tablet, oral, Daily    cyanocobalamin (VITAMIN B-12) 500 mcg, oral, Daily    docusate sodium (COLACE) 100 mg, oral, 2 times daily PRN    famotidine (PEPCID) 40 mg, oral, Daily    hydroCHLOROthiazide (HYDRODIURIL) 50 mg, oral, Daily    lisinopril 40 mg, oral, Daily, (Patient will call when needed)    methIMAzole (Tapazole) 10 mg tablet Take half tab S,m,w,thurs,f Pt will call when needed    NON FORMULARY QC Tumeric Complex 500 MG oral capsules<BR>Take 2 pills in the morning and 2 pill in the evening    potassium chloride CR 20 mEq ER tablet 20 mEq, oral, 2 times daily        No Known Allergies     Social History     Socioeconomic History    Marital status:      Spouse name: Not on file    Number of children: Not on file    Years of education: Not on file    Highest education level: Not on file   Occupational History    Not on file   Tobacco Use    Smoking status: Never    Smokeless tobacco: Never   Substance and Sexual Activity    Alcohol use: Not on file    Drug use: Not on file    Sexual activity: Not on file   Other Topics Concern    Not on file   Social History Narrative    Not on file     Social Determinants of Health     Financial Resource Strain: Not on file   Food Insecurity: Not on file   Transportation Needs: Not on file   Physical Activity: Not on file   Stress: Not on file   Social Connections: Not on file   Intimate Partner Violence: Not on file   Housing Stability: Not on file        Family History   Problem  Relation Name Age of Onset    Hypertension Mother      Liver cancer Father      Liver disease Father      Coronary artery disease Brother          CABG 55    Liver disease Son      Coronary artery disease Paternal Grandfather          MI 47        Objective     OBJECTIVE:     Vitals:    09/20/23 0855   BP: 104/60   Pulse: 66   Temp: 36.8 °C (98.3 °F)   SpO2: 98%   Weight: 91.2 kg (201 lb)        Physical Exam  HENT:      Head: Normocephalic and atraumatic.      Nose: Nose normal.      Mouth/Throat:      Mouth: Mucous membranes are moist.   Eyes:      Pupils: Pupils are equal, round, and reactive to light.   Cardiovascular:      Rate and Rhythm: Normal rate and regular rhythm.      Pulses: Normal pulses.      Heart sounds: No murmur heard.  Pulmonary:      Effort: Pulmonary effort is normal.      Breath sounds: Normal breath sounds.   Abdominal:      Tenderness: There is no abdominal tenderness.   Musculoskeletal:         General: Normal range of motion.      Cervical back: Normal range of motion.   Feet:      Right foot:      Toenail Condition: Right toenails are abnormally thick.      Left foot:      Toenail Condition: Left toenails are abnormally thick.   Skin:     General: Skin is warm and dry.   Neurological:      Mental Status: She is alert.   Psychiatric:         Mood and Affect: Mood normal.

## 2023-09-20 NOTE — PATIENT INSTRUCTIONS
Thyrotoxicosis: Continue with them as all 10 mg, will recheck TSH in 6 weeks, if still elevated plan is to refer patient to endocrinology  Constipation: Try docusate twice a day as needed  Preventative: Provided flu vaccine

## 2023-11-06 ENCOUNTER — LAB (OUTPATIENT)
Dept: LAB | Facility: LAB | Age: 83
End: 2023-11-06
Payer: MEDICARE

## 2023-11-06 DIAGNOSIS — E78.2 MIXED HYPERLIPIDEMIA: ICD-10-CM

## 2023-11-06 DIAGNOSIS — E05.90 THYROTOXICOSIS WITHOUT THYROID STORM, UNSPECIFIED THYROTOXICOSIS TYPE: ICD-10-CM

## 2023-11-06 LAB
CHOLEST SERPL-MCNC: 179 MG/DL (ref 0–199)
CHOLESTEROL/HDL RATIO: 3.2
HDLC SERPL-MCNC: 56.7 MG/DL
LDLC SERPL CALC-MCNC: 107 MG/DL
NON HDL CHOLESTEROL: 122 MG/DL (ref 0–149)
T4 FREE SERPL-MCNC: 1.34 NG/DL (ref 0.61–1.12)
TRIGL SERPL-MCNC: 75 MG/DL (ref 0–149)
TSH SERPL-ACNC: 0.04 MIU/L (ref 0.44–3.98)
VLDL: 15 MG/DL (ref 0–40)

## 2023-11-06 PROCEDURE — 36415 COLL VENOUS BLD VENIPUNCTURE: CPT

## 2023-11-06 PROCEDURE — 80061 LIPID PANEL: CPT

## 2023-11-06 PROCEDURE — 84439 ASSAY OF FREE THYROXINE: CPT

## 2023-11-06 PROCEDURE — 84443 ASSAY THYROID STIM HORMONE: CPT

## 2023-11-07 ENCOUNTER — TELEPHONE (OUTPATIENT)
Dept: PRIMARY CARE | Facility: CLINIC | Age: 83
End: 2023-11-07
Payer: MEDICARE

## 2023-11-07 DIAGNOSIS — E05.90 THYROTOXICOSIS WITHOUT THYROID STORM, UNSPECIFIED THYROTOXICOSIS TYPE: Primary | ICD-10-CM

## 2023-11-07 NOTE — TELEPHONE ENCOUNTER
----- Message from Ysabel Funk DO sent at 11/7/2023 11:46 AM EST -----  Please let patient know the following:  Thyroid levels are still slightly elevated, I will need to refer her to endocrinology   LDL increased to 107, monitor simple carbohydrates like ( rice, pasta, potatoes, sweets)

## 2024-02-05 ENCOUNTER — TELEPHONE (OUTPATIENT)
Dept: PRIMARY CARE | Facility: CLINIC | Age: 84
End: 2024-02-05
Payer: MEDICARE

## 2024-02-05 NOTE — TELEPHONE ENCOUNTER
Pt called rx line at [id number] requesting a refill on     -methimazole    Called [facility], they will get rx ready and contact pt

## 2024-02-28 DIAGNOSIS — I10 BENIGN ESSENTIAL HYPERTENSION: ICD-10-CM

## 2024-02-29 RX ORDER — LISINOPRIL 40 MG/1
TABLET ORAL
Qty: 90 TABLET | Refills: 1 | OUTPATIENT
Start: 2024-02-29

## 2024-03-02 DIAGNOSIS — I10 BENIGN ESSENTIAL HYPERTENSION: ICD-10-CM

## 2024-03-06 RX ORDER — LISINOPRIL 40 MG/1
TABLET ORAL
Qty: 90 TABLET | Refills: 1 | OUTPATIENT
Start: 2024-03-06

## 2024-03-08 ENCOUNTER — LAB (OUTPATIENT)
Dept: LAB | Facility: LAB | Age: 84
End: 2024-03-08
Payer: MEDICARE

## 2024-03-08 DIAGNOSIS — I10 BENIGN ESSENTIAL HYPERTENSION: ICD-10-CM

## 2024-03-08 DIAGNOSIS — E78.2 MIXED HYPERLIPIDEMIA: ICD-10-CM

## 2024-03-08 DIAGNOSIS — E05.90 THYROTOXICOSIS WITHOUT THYROID STORM, UNSPECIFIED THYROTOXICOSIS TYPE: ICD-10-CM

## 2024-03-08 LAB
ANION GAP SERPL CALC-SCNC: 12 MMOL/L (ref 10–20)
BUN SERPL-MCNC: 16 MG/DL (ref 6–23)
CALCIUM SERPL-MCNC: 9.1 MG/DL (ref 8.6–10.3)
CHLORIDE SERPL-SCNC: 105 MMOL/L (ref 98–107)
CHOLEST SERPL-MCNC: 160 MG/DL (ref 0–199)
CHOLESTEROL/HDL RATIO: 3.3
CO2 SERPL-SCNC: 29 MMOL/L (ref 21–32)
CREAT SERPL-MCNC: 0.74 MG/DL (ref 0.5–1.05)
EGFRCR SERPLBLD CKD-EPI 2021: 81 ML/MIN/1.73M*2
GLUCOSE SERPL-MCNC: 76 MG/DL (ref 74–99)
HDLC SERPL-MCNC: 48.1 MG/DL
LDLC SERPL CALC-MCNC: 95 MG/DL
NON HDL CHOLESTEROL: 112 MG/DL (ref 0–149)
POTASSIUM SERPL-SCNC: 3.9 MMOL/L (ref 3.5–5.3)
SODIUM SERPL-SCNC: 142 MMOL/L (ref 136–145)
T4 FREE SERPL-MCNC: 1.04 NG/DL (ref 0.61–1.12)
TRIGL SERPL-MCNC: 86 MG/DL (ref 0–149)
TSH SERPL-ACNC: 0.03 MIU/L (ref 0.44–3.98)
VLDL: 17 MG/DL (ref 0–40)

## 2024-03-08 PROCEDURE — 80061 LIPID PANEL: CPT

## 2024-03-08 PROCEDURE — 84443 ASSAY THYROID STIM HORMONE: CPT

## 2024-03-08 PROCEDURE — 84439 ASSAY OF FREE THYROXINE: CPT

## 2024-03-08 PROCEDURE — 36415 COLL VENOUS BLD VENIPUNCTURE: CPT

## 2024-03-08 PROCEDURE — 80048 BASIC METABOLIC PNL TOTAL CA: CPT

## 2024-03-19 ENCOUNTER — OFFICE VISIT (OUTPATIENT)
Dept: PRIMARY CARE | Facility: CLINIC | Age: 84
End: 2024-03-19
Payer: MEDICARE

## 2024-03-19 VITALS
WEIGHT: 201 LBS | HEART RATE: 76 BPM | SYSTOLIC BLOOD PRESSURE: 140 MMHG | HEIGHT: 59 IN | TEMPERATURE: 97.1 F | BODY MASS INDEX: 40.52 KG/M2 | OXYGEN SATURATION: 96 % | DIASTOLIC BLOOD PRESSURE: 82 MMHG

## 2024-03-19 DIAGNOSIS — Z78.9 FULL CODE STATUS: ICD-10-CM

## 2024-03-19 DIAGNOSIS — I10 BENIGN ESSENTIAL HYPERTENSION: ICD-10-CM

## 2024-03-19 DIAGNOSIS — K59.04 CHRONIC IDIOPATHIC CONSTIPATION: ICD-10-CM

## 2024-03-19 DIAGNOSIS — K21.9 GASTROESOPHAGEAL REFLUX DISEASE WITHOUT ESOPHAGITIS: ICD-10-CM

## 2024-03-19 DIAGNOSIS — E05.90 THYROTOXICOSIS WITHOUT THYROID STORM, UNSPECIFIED THYROTOXICOSIS TYPE: Primary | ICD-10-CM

## 2024-03-19 DIAGNOSIS — E78.2 MIXED HYPERLIPIDEMIA: ICD-10-CM

## 2024-03-19 PROCEDURE — 1036F TOBACCO NON-USER: CPT | Performed by: FAMILY MEDICINE

## 2024-03-19 PROCEDURE — 3079F DIAST BP 80-89 MM HG: CPT | Performed by: FAMILY MEDICINE

## 2024-03-19 PROCEDURE — 1170F FXNL STATUS ASSESSED: CPT | Performed by: FAMILY MEDICINE

## 2024-03-19 PROCEDURE — 1160F RVW MEDS BY RX/DR IN RCRD: CPT | Performed by: FAMILY MEDICINE

## 2024-03-19 PROCEDURE — 99214 OFFICE O/P EST MOD 30 MIN: CPT | Performed by: FAMILY MEDICINE

## 2024-03-19 PROCEDURE — 1159F MED LIST DOCD IN RCRD: CPT | Performed by: FAMILY MEDICINE

## 2024-03-19 PROCEDURE — 1158F ADVNC CARE PLAN TLK DOCD: CPT | Performed by: FAMILY MEDICINE

## 2024-03-19 PROCEDURE — 3077F SYST BP >= 140 MM HG: CPT | Performed by: FAMILY MEDICINE

## 2024-03-19 PROCEDURE — 1123F ACP DISCUSS/DSCN MKR DOCD: CPT | Performed by: FAMILY MEDICINE

## 2024-03-19 RX ORDER — HYDROCHLOROTHIAZIDE 25 MG/1
50 TABLET ORAL DAILY
Qty: 180 TABLET | Refills: 1 | Status: SHIPPED | OUTPATIENT
Start: 2024-03-19 | End: 2024-09-15

## 2024-03-19 RX ORDER — FAMOTIDINE 40 MG/1
40 TABLET, FILM COATED ORAL DAILY
Qty: 90 TABLET | Refills: 1 | Status: SHIPPED | OUTPATIENT
Start: 2024-03-19 | End: 2024-09-15

## 2024-03-19 RX ORDER — POTASSIUM CHLORIDE 20 MEQ/1
20 TABLET, EXTENDED RELEASE ORAL 2 TIMES DAILY
Qty: 180 TABLET | Refills: 1 | Status: SHIPPED | OUTPATIENT
Start: 2024-03-19 | End: 2024-09-15

## 2024-03-19 RX ORDER — DOCUSATE SODIUM 100 MG/1
100 CAPSULE, LIQUID FILLED ORAL 2 TIMES DAILY PRN
Qty: 180 CAPSULE | Refills: 1 | Status: CANCELLED | OUTPATIENT
Start: 2024-03-19 | End: 2024-09-15

## 2024-03-19 RX ORDER — LISINOPRIL 40 MG/1
40 TABLET ORAL DAILY
Qty: 90 TABLET | Refills: 1 | Status: SHIPPED | OUTPATIENT
Start: 2024-03-19 | End: 2024-09-15

## 2024-03-19 ASSESSMENT — ACTIVITIES OF DAILY LIVING (ADL)
TAKING_MEDICATION: INDEPENDENT
DOING_HOUSEWORK: INDEPENDENT
GROCERY_SHOPPING: INDEPENDENT
MANAGING_FINANCES: INDEPENDENT
BATHING: INDEPENDENT
DRESSING: INDEPENDENT

## 2024-03-19 NOTE — PROGRESS NOTES
Assessment     ASSESSMENT/PLAN:      Patient Instructions   1. 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 Gastroesophageal reflux disease without esophagitis  Controlled with famotidine, refill sent  - famotidine (Pepcid) 40 mg tablet; Take 1 tablet (40 mg) by mouth once daily.  Dispense: 90 tablet; Refill: 1    3. Benign essential hypertension  Controlled with lisinopril/hydrochlorothiazide, refill  - hydroCHLOROthiazide (HYDRODiuril) 25 mg tablet; Take 2 tablets (50 mg) by mouth once daily.  Dispense: 180 tablet; Refill: 1  - lisinopril 40 mg tablet; Take 1 tablet (40 mg) by mouth once daily. (Patient will call when needed)  Dispense: 90 tablet; Refill: 1  - potassium chloride CR 20 mEq ER tablet; Take 1 tablet (20 mEq) by mouth 2 times a day.  Dispense: 180 tablet; Refill: 1  - Basic Metabolic Panel; Future    4. Chronic idiopathic constipation  Patient will call if she needs a referral to GI otherwise she will use over-the-counter medications    5. Mixed hyperlipidemia    - Lipid Panel; Future    6. Full code status    - Full code           Signed by: Ysabel Funk DO       FUTURE DIRECTION:   []    Subjective   SUBJECTIVE:     HPI : Patient is a 83 y.o. female who presents today for the following:     HTN:   - Controlled with lisinopril 40 mg, HCTZ 25 mg daily     Hypokalemia:   - Controlled with potassium 20 mEq     Hypothyroidism:   -on methimazole 10 mg     DJD:   - Controlled with Tylenol l     GERD:   -Controlled with famotidine 40 mg     Colitis:   - has been seen by GI, improved with budesonide, trying to adhere intolerant to lactose     Preventative  Colonoscopy: Last colonoscopy January 2022, Dr Catherine, next one in 5 year.2027  Vaccine: Pneumovax 2009, Shingrix 2020, UTD on COVID  Bone density: Last DEXA scan 5/2023 is [facility]      Care  team  Dermatology:   GI: Diane MEEKS    Review of Systems    History reviewed. No pertinent past medical history.     History reviewed. No pertinent surgical history.     Current Outpatient Medications   Medication Instructions    aspirin 81 mg EC tablet 1 tablet, oral, Daily, Enteric    betamethasone valerate (Valisone) 0.1 % cream Topical, Daily PRN, Apply to affected area sparingly    cholecalciferol (Vitamin D-3) 10 MCG (400 UNIT) tablet 1 tablet, oral, Daily    cyanocobalamin (VITAMIN B-12) 500 mcg, oral, Daily    famotidine (PEPCID) 40 mg, oral, Daily    hydroCHLOROthiazide (HYDRODIURIL) 50 mg, oral, Daily    lisinopril 40 mg, oral, Daily, (Patient will call when needed)    methIMAzole (Tapazole) 10 mg tablet Take half tab S,m,w,thurs,f Pt will call when needed    NON FORMULARY QC Tumeric Complex 500 MG oral capsules<BR>Take 2 pills in the morning and 2 pill in the evening    potassium chloride CR 20 mEq ER tablet 20 mEq, oral, 2 times daily        No Known Allergies     Social History     Socioeconomic History    Marital status:      Spouse name: Not on file    Number of children: Not on file    Years of education: Not on file    Highest education level: Not on file   Occupational History    Not on file   Tobacco Use    Smoking status: Never    Smokeless tobacco: Never   Substance and Sexual Activity    Alcohol use: Not on file    Drug use: Not on file    Sexual activity: Not on file   Other Topics Concern    Not on file   Social History Narrative    Not on file     Social Determinants of Health     Financial Resource Strain: Not on file   Food Insecurity: Not on file   Transportation Needs: Not on file   Physical Activity: Not on file   Stress: Not on file   Social Connections: Not on file   Intimate Partner Violence: Not on file   Housing Stability: Not on file        Family History   Problem Relation Name Age of Onset    Hypertension Mother      Liver cancer Father      Liver disease Father    "   Coronary artery disease Brother          CABG 55    Liver disease Son      Coronary artery disease Paternal Grandfather          MI 47        Objective     OBJECTIVE:     Vitals:    03/19/24 0802   BP: 140/82   Pulse: 76   Temp: 36.2 °C (97.1 °F)   SpO2: 96%   Weight: 91.2 kg (201 lb)   Height: 1.499 m (4' 11\")        Physical Exam  Constitutional:       Appearance: She is obese.   HENT:      Head: Normocephalic and atraumatic.      Nose: Nose normal.      Mouth/Throat:      Mouth: Mucous membranes are moist.   Eyes:      Pupils: Pupils are equal, round, and reactive to light.   Cardiovascular:      Rate and Rhythm: Normal rate and regular rhythm.      Pulses: Normal pulses.      Heart sounds: No murmur heard.  Pulmonary:      Effort: Pulmonary effort is normal.      Breath sounds: Normal breath sounds.   Abdominal:      Tenderness: There is no abdominal tenderness.   Musculoskeletal:         General: Normal range of motion.      Cervical back: Normal range of motion.      Right lower leg: Edema present.      Left lower leg: Edema present.   Skin:     General: Skin is warm and dry.   Neurological:      Mental Status: She is alert.   Psychiatric:         Mood and Affect: Mood normal.             "

## 2024-03-19 NOTE — PATIENT INSTRUCTIONS
1. 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 Gastroesophageal reflux disease without esophagitis  Controlled with famotidine, refill sent  - famotidine (Pepcid) 40 mg tablet; Take 1 tablet (40 mg) by mouth once daily.  Dispense: 90 tablet; Refill: 1    3. Benign essential hypertension  Controlled with lisinopril/hydrochlorothiazide, refill  - hydroCHLOROthiazide (HYDRODiuril) 25 mg tablet; Take 2 tablets (50 mg) by mouth once daily.  Dispense: 180 tablet; Refill: 1  - lisinopril 40 mg tablet; Take 1 tablet (40 mg) by mouth once daily. (Patient will call when needed)  Dispense: 90 tablet; Refill: 1  - potassium chloride CR 20 mEq ER tablet; Take 1 tablet (20 mEq) by mouth 2 times a day.  Dispense: 180 tablet; Refill: 1  - Basic Metabolic Panel; Future    4. Chronic idiopathic constipation  Patient will call if she needs a referral to GI otherwise she will use over-the-counter medications    5. Mixed hyperlipidemia    - Lipid Panel; Future    6. Full code status    - Full code

## 2024-04-16 ENCOUNTER — LAB (OUTPATIENT)
Dept: LAB | Facility: LAB | Age: 84
End: 2024-04-16
Payer: MEDICARE

## 2024-04-16 ENCOUNTER — OFFICE VISIT (OUTPATIENT)
Dept: ENDOCRINOLOGY | Facility: CLINIC | Age: 84
End: 2024-04-16
Payer: MEDICARE

## 2024-04-16 VITALS
BODY MASS INDEX: 40.52 KG/M2 | HEIGHT: 59 IN | WEIGHT: 201 LBS | SYSTOLIC BLOOD PRESSURE: 116 MMHG | DIASTOLIC BLOOD PRESSURE: 62 MMHG | HEART RATE: 71 BPM

## 2024-04-16 DIAGNOSIS — E05.90 THYROTOXICOSIS WITHOUT THYROID STORM, UNSPECIFIED THYROTOXICOSIS TYPE: ICD-10-CM

## 2024-04-16 LAB
T3 SERPL-MCNC: 132 NG/DL (ref 60–200)
T4 FREE SERPL-MCNC: 0.91 NG/DL (ref 0.61–1.12)
THYROPEROXIDASE AB SERPL-ACNC: 45 IU/ML
TSH SERPL-ACNC: 0.04 MIU/L (ref 0.44–3.98)

## 2024-04-16 PROCEDURE — 86376 MICROSOMAL ANTIBODY EACH: CPT

## 2024-04-16 PROCEDURE — 84439 ASSAY OF FREE THYROXINE: CPT

## 2024-04-16 PROCEDURE — 83519 RIA NONANTIBODY: CPT

## 2024-04-16 PROCEDURE — 1160F RVW MEDS BY RX/DR IN RCRD: CPT | Performed by: INTERNAL MEDICINE

## 2024-04-16 PROCEDURE — 99203 OFFICE O/P NEW LOW 30 MIN: CPT | Performed by: INTERNAL MEDICINE

## 2024-04-16 PROCEDURE — 36415 COLL VENOUS BLD VENIPUNCTURE: CPT

## 2024-04-16 PROCEDURE — 84480 ASSAY TRIIODOTHYRONINE (T3): CPT

## 2024-04-16 PROCEDURE — 1123F ACP DISCUSS/DSCN MKR DOCD: CPT | Performed by: INTERNAL MEDICINE

## 2024-04-16 PROCEDURE — 84445 ASSAY OF TSI GLOBULIN: CPT

## 2024-04-16 PROCEDURE — 3078F DIAST BP <80 MM HG: CPT | Performed by: INTERNAL MEDICINE

## 2024-04-16 PROCEDURE — 1159F MED LIST DOCD IN RCRD: CPT | Performed by: INTERNAL MEDICINE

## 2024-04-16 PROCEDURE — 84443 ASSAY THYROID STIM HORMONE: CPT

## 2024-04-16 PROCEDURE — 3074F SYST BP LT 130 MM HG: CPT | Performed by: INTERNAL MEDICINE

## 2024-04-16 ASSESSMENT — ENCOUNTER SYMPTOMS
CONSTIPATION: 1
ARTHRALGIAS: 1
DIARRHEA: 1

## 2024-04-16 NOTE — PROGRESS NOTES
"Subjective   Amena Rubio is a 83 y.o. female who I was asked to see in consultation for evaluation of hyperthyroidism.     She states that her previous PCP suggested that she had Grave's disease.     Current Reigmen  Methimazole 5mg once daily x 5 days     Symptoms are as listed below:  Energy levels -  fair; takes Vitmain B12   Sleep -  awakenings due to colitis  Temperature intolerances -  cold to thte feet   Bowel movements -  alternates between diarrhea and constipation   Hair changes -  admits to shedding; chronic for 5 years   Skin changes -  denies   Palpitations - denies  Diaphoresis -  denies  Tremors -denies   Dysphagia -admits with potassium pill   Dyspnea upon lying supine -  denies  Dysphonia -  denies   Weight changes -  denies     Review of Systems   Gastrointestinal:  Positive for constipation and diarrhea.   Musculoskeletal:  Positive for arthralgias (Left soulder and wrist).   All other systems reviewed and are negative.      Objective   /62 (BP Location: Right arm, Patient Position: Sitting, BP Cuff Size: Adult)   Pulse 71   Ht 1.499 m (4' 11\")   Wt 91.2 kg (201 lb)   BMI 40.60 kg/m²   Physical Exam  Vitals and nursing note reviewed.   Constitutional:       General: She is not in acute distress.     Appearance: Normal appearance. She is normal weight.   HENT:      Head: Normocephalic and atraumatic.      Nose: Nose normal.      Mouth/Throat:      Mouth: Mucous membranes are moist.   Eyes:      Extraocular Movements: Extraocular movements intact.   Cardiovascular:      Rate and Rhythm: Normal rate and regular rhythm.   Pulmonary:      Effort: Pulmonary effort is normal.      Breath sounds: Normal breath sounds.   Musculoskeletal:         General: Normal range of motion.   Skin:     General: Skin is warm.   Neurological:      Mental Status: She is alert and oriented to person, place, and time.   Psychiatric:         Mood and Affect: Mood normal.        Lab Results   Component Value " Date    TSH 0.03 (L) 03/08/2024    FREET4 1.04 03/08/2024         Assessment/Plan   83-year-old female presents for the evaluation for the management of thyrotoxicosis.    Thyrotoxicosis  To continue Methimazole 5mg once daily for five days per week  To obtain a thyroid ultrasound  Will follow up with results

## 2024-04-16 NOTE — PATIENT INSTRUCTIONS
Thank you for choosing [facility]  for your health care needs.  If you have any questions, concerns or medical needs, please feel free to contact our office at [phone].    Please ensure you complete your blood work one week before the next scheduled appointment.    To continue Methimazole 5mg once daily for five days per week  To obtain a thyroid ultrasound  Will follow up with results

## 2024-04-18 LAB — TSH RECEP AB SER-ACNC: <1.1 IU/L

## 2024-04-21 NOTE — ASSESSMENT & PLAN NOTE
To continue Methimazole 5mg once daily for five days per week  To obtain a thyroid ultrasound  Will follow up with results

## 2024-04-22 LAB — TSI SER-ACNC: <1 TSI INDEX

## 2024-04-29 ENCOUNTER — HOSPITAL ENCOUNTER (OUTPATIENT)
Dept: RADIOLOGY | Facility: CLINIC | Age: 84
Discharge: HOME | End: 2024-04-29
Payer: MEDICARE

## 2024-04-29 PROCEDURE — 76536 US EXAM OF HEAD AND NECK: CPT | Performed by: RADIOLOGY

## 2024-04-29 PROCEDURE — 76536 US EXAM OF HEAD AND NECK: CPT

## 2024-04-29 RX ORDER — ACETAMINOPHEN 500 MG
TABLET ORAL EVERY 6 HOURS PRN
COMMUNITY

## 2024-05-20 ENCOUNTER — OFFICE VISIT (OUTPATIENT)
Dept: PRIMARY CARE | Facility: CLINIC | Age: 84
End: 2024-05-20
Payer: MEDICARE

## 2024-05-20 VITALS
TEMPERATURE: 97.3 F | HEART RATE: 73 BPM | DIASTOLIC BLOOD PRESSURE: 80 MMHG | OXYGEN SATURATION: 98 % | SYSTOLIC BLOOD PRESSURE: 124 MMHG

## 2024-05-20 DIAGNOSIS — J06.9 UPPER RESPIRATORY TRACT INFECTION, UNSPECIFIED TYPE: Primary | ICD-10-CM

## 2024-05-20 DIAGNOSIS — R05.1 ACUTE COUGH: ICD-10-CM

## 2024-05-20 LAB
FLUAV RNA RESP QL NAA+PROBE: NOT DETECTED
FLUBV RNA RESP QL NAA+PROBE: NOT DETECTED
RSV RNA RESP QL NAA+PROBE: NOT DETECTED

## 2024-05-20 PROCEDURE — 3079F DIAST BP 80-89 MM HG: CPT | Performed by: PHYSICIAN ASSISTANT

## 2024-05-20 PROCEDURE — 87634 RSV DNA/RNA AMP PROBE: CPT

## 2024-05-20 PROCEDURE — 99214 OFFICE O/P EST MOD 30 MIN: CPT | Performed by: PHYSICIAN ASSISTANT

## 2024-05-20 PROCEDURE — 3074F SYST BP LT 130 MM HG: CPT | Performed by: PHYSICIAN ASSISTANT

## 2024-05-20 PROCEDURE — 1160F RVW MEDS BY RX/DR IN RCRD: CPT | Performed by: PHYSICIAN ASSISTANT

## 2024-05-20 PROCEDURE — 87636 SARSCOV2 & INF A&B AMP PRB: CPT

## 2024-05-20 PROCEDURE — 1123F ACP DISCUSS/DSCN MKR DOCD: CPT | Performed by: PHYSICIAN ASSISTANT

## 2024-05-20 PROCEDURE — 1036F TOBACCO NON-USER: CPT | Performed by: PHYSICIAN ASSISTANT

## 2024-05-20 PROCEDURE — 1159F MED LIST DOCD IN RCRD: CPT | Performed by: PHYSICIAN ASSISTANT

## 2024-05-20 RX ORDER — BENZONATATE 100 MG/1
100 CAPSULE ORAL 3 TIMES DAILY PRN
Qty: 30 CAPSULE | Refills: 0 | Status: SHIPPED | OUTPATIENT
Start: 2024-05-20 | End: 2024-06-19

## 2024-05-20 ASSESSMENT — ENCOUNTER SYMPTOMS
COUGH: 1
SHORTNESS OF BREATH: 0

## 2024-05-20 NOTE — PROGRESS NOTES
Subjective   Patient ID: Amena Rubio is a 84 y.o. female who presents for Cough and Nasal Congestion (And drainage x 3 days//COVID collected).    HPI   Patient c/o cough, nasal discharge. Denies fever, chills, aches, shortness of breath. Has scratchy throat in the morning.   Present for 3 days. Hasn't worsened.   Cough: Cough is raspy, mainly in the morning.   Nasal discharge: Described as clear.   Denies associated earache and vomiting.     Taking OTC Vicks and Mucinex, which seem to help.     Patient denies recent known COVID exposure or international travel.  [place]  Concerned since she potentially could be near her infant grandchild later this week that has lung problems.        reports that she has never smoked. She has never used smokeless tobacco.    Review of Systems   Respiratory:  Positive for cough. Negative for shortness of breath.    Cardiovascular:  Negative for chest pain.       Objective   /80   Pulse 73   Temp 36.3 °C (97.3 °F)   SpO2 98%     Physical Exam  Vitals and nursing note reviewed.   Constitutional:       General: She is not in acute distress.     Appearance: Normal appearance. She is not toxic-appearing.   HENT:      Head: Normocephalic.      Right Ear: Tympanic membrane, ear canal and external ear normal.      Left Ear: Tympanic membrane, ear canal and external ear normal.      Nose: Rhinorrhea present.      Right Sinus: No maxillary sinus tenderness or frontal sinus tenderness.      Left Sinus: No maxillary sinus tenderness or frontal sinus tenderness.      Mouth/Throat:      Mouth: Mucous membranes are moist.      Pharynx: No oropharyngeal exudate or posterior oropharyngeal erythema.   Eyes:      General: No scleral icterus.  Cardiovascular:      Rate and Rhythm: Normal rate and regular rhythm.   Pulmonary:      Effort: Pulmonary effort is normal.      Breath sounds: Normal breath sounds. No wheezing or rhonchi.   Lymphadenopathy:      Cervical:  No cervical adenopathy.   Neurological:      Mental Status: She is alert.         Assessment/Plan   Diagnoses and all orders for this visit:  Upper respiratory tract infection, unspecified type  -     Sars-CoV-2 PCR; Future  -     Influenza A, and B PCR; Future  -     RSV PCR; Future  Acute cough  -     benzonatate (Tessalon) 100 mg capsule; Take 1 capsule (100 mg) by mouth 3 times a day as needed for cough. Do not crush or chew.  -     Sars-CoV-2 PCR; Future  -     Influenza A, and B PCR; Future  -     RSV PCR; Future       Discussed probable viral nature of illness.   Can use Mucinex DM.   Rx Tessalon prn cough.  Send nasal swab specimen for COVID, Influenza, and RSV testing.   Encourage fluids and rest.   Follow up if symptoms increase or persist.

## 2024-05-21 ENCOUNTER — TELEPHONE (OUTPATIENT)
Dept: PRIMARY CARE | Facility: CLINIC | Age: 84
End: 2024-05-21
Payer: MEDICARE

## 2024-05-21 LAB — SARS-COV-2 RNA RESP QL NAA+PROBE: NOT DETECTED

## 2024-05-21 NOTE — TELEPHONE ENCOUNTER
----- Message from Geoffrey Fitzgerald PA-C sent at 5/21/2024  7:14 AM EDT -----  Inform pt that her COVID, influenza, and RSV tests were all negative.

## 2024-06-03 ENCOUNTER — TELEPHONE (OUTPATIENT)
Dept: ENDOCRINOLOGY | Facility: CLINIC | Age: 84
End: 2024-06-03
Payer: MEDICARE

## 2024-06-03 DIAGNOSIS — E04.2 MULTINODULAR GOITER: ICD-10-CM

## 2024-06-03 DIAGNOSIS — E05.90 THYROTOXICOSIS WITHOUT THYROID STORM, UNSPECIFIED THYROTOXICOSIS TYPE: Primary | ICD-10-CM

## 2024-06-03 NOTE — TELEPHONE ENCOUNTER
Patient denies biopsy. She states she doesn't recall having one. Patient expressed concern because of wait time of ultrasound results and mention of biopsy. Please advise next steps.

## 2024-06-03 NOTE — RESULT ENCOUNTER NOTE
Thyroid ultrasound has been reviewed.  There is a nodule to the left thyroid which was moderately suspicious.  It was mentioned by radiology that this previously biopsied.  Please confirm if this is true and if so, what was the result.

## 2024-06-03 NOTE — TELEPHONE ENCOUNTER
----- Message from Nyla Mario MD sent at 6/3/2024  5:50 AM EDT -----  Thyroid ultrasound has been reviewed.  There is a nodule to the left thyroid which was moderately suspicious.  It was mentioned by radiology that this previously biopsied.  Please confirm if this is true and if so, what was the result.

## 2024-06-04 NOTE — TELEPHONE ENCOUNTER
Relayed to patient. Verbalized understanding. Patient provided uptake and scan number and transferred to  [phone]

## 2024-06-04 NOTE — TELEPHONE ENCOUNTER
Radiologist was of the believe that this was previously biopsied as per report.    Prior to decision of a biopsy, a thyroid uptake and scan will need to be obtained.  If nodules are hot and overactive, a biopsy is not needed.  If the nodule is cold, then it warrants a biopsy.    Please stop methimazole five days prior to thyroid uptake and scan appointment date.

## 2024-06-26 ENCOUNTER — HOSPITAL ENCOUNTER (OUTPATIENT)
Dept: RADIOLOGY | Facility: HOSPITAL | Age: 84
Discharge: HOME | End: 2024-06-26
Payer: MEDICARE

## 2024-06-26 DIAGNOSIS — E05.90 THYROTOXICOSIS WITHOUT THYROID STORM, UNSPECIFIED THYROTOXICOSIS TYPE: ICD-10-CM

## 2024-06-26 DIAGNOSIS — E04.2 MULTINODULAR GOITER: ICD-10-CM

## 2024-06-26 PROCEDURE — 3430000001 HC RX 343 DIAGNOSTIC RADIOPHARMACEUTICALS: Performed by: RADIOLOGY

## 2024-06-26 PROCEDURE — 78014 THYROID IMAGING W/BLOOD FLOW: CPT

## 2024-06-26 PROCEDURE — 78014 THYROID IMAGING W/BLOOD FLOW: CPT | Performed by: RADIOLOGY

## 2024-06-26 PROCEDURE — A9516 IODINE I-123 SOD IODIDE MIC: HCPCS | Performed by: RADIOLOGY

## 2024-06-26 RX ORDER — SODIUM IODIDE I 123 200 UCI/1
400 CAPSULE, GELATIN COATED ORAL
Status: DISCONTINUED | OUTPATIENT
Start: 2024-06-26 | End: 2024-06-27 | Stop reason: HOSPADM

## 2024-06-26 RX ORDER — SODIUM IODIDE I 123 200 UCI/1
400 CAPSULE, GELATIN COATED ORAL
Status: CANCELLED | OUTPATIENT
Start: 2024-06-26

## 2024-06-27 ENCOUNTER — HOSPITAL ENCOUNTER (OUTPATIENT)
Dept: RADIOLOGY | Facility: HOSPITAL | Age: 84
Discharge: HOME | End: 2024-06-27
Payer: MEDICARE

## 2024-06-27 DIAGNOSIS — E05.90 THYROTOXICOSIS WITHOUT THYROID STORM, UNSPECIFIED THYROTOXICOSIS TYPE: Primary | ICD-10-CM

## 2024-06-27 DIAGNOSIS — E05.10 TOXIC THYROID NODULE: ICD-10-CM

## 2024-06-27 NOTE — RESULT ENCOUNTER NOTE
Thyroid uptake and scan has been reviewed.  The reason for hyperthyroidism is an overactive left thyroid nodule and not Grave's disease as suggested by PCP.  Please continue the same dose of Methimazole and follow up in 4 months with repeat labs.

## 2024-06-28 ENCOUNTER — TELEPHONE (OUTPATIENT)
Dept: ENDOCRINOLOGY | Facility: CLINIC | Age: 84
End: 2024-06-28
Payer: MEDICARE

## 2024-06-28 NOTE — TELEPHONE ENCOUNTER
----- Message from Nyla Mario MD sent at 6/27/2024  7:59 PM EDT -----  Thyroid uptake and scan has been reviewed.  The reason for hyperthyroidism is an overactive left thyroid nodule and not Grave's disease as suggested by PCP.  Please continue the same dose of Methimazole and follow up in 4 months with repeat labs.

## 2024-06-28 NOTE — TELEPHONE ENCOUNTER
Left message for patient to return call.  Please advise of results when the patient returns call.  Thank you!/ [facility]    X Size Of Lesion In Cm (Optional): 0.6

## 2024-08-12 ENCOUNTER — TELEPHONE (OUTPATIENT)
Dept: PRIMARY CARE | Facility: CLINIC | Age: 84
End: 2024-08-12
Payer: MEDICARE

## 2024-08-12 DIAGNOSIS — M19.91 LOCALIZED, PRIMARY OSTEOARTHRITIS: Primary | ICD-10-CM

## 2024-08-12 NOTE — TELEPHONE ENCOUNTER
Pt called Rx line asking for a renewal on her handicap placard? Please advise, AM    Pt did state she wanted it mailed to her, we cannot do that so when calling pt back to  we need to inform of this. Thanks, AM

## 2024-08-16 DIAGNOSIS — E05.90 THYROTOXICOSIS WITHOUT THYROID STORM, UNSPECIFIED THYROTOXICOSIS TYPE: ICD-10-CM

## 2024-08-16 RX ORDER — METHIMAZOLE 10 MG/1
TABLET ORAL
Qty: 60 TABLET | Refills: 2 | OUTPATIENT
Start: 2024-08-16

## 2024-08-20 DIAGNOSIS — I10 BENIGN ESSENTIAL HYPERTENSION: ICD-10-CM

## 2024-08-21 RX ORDER — LISINOPRIL 40 MG/1
40 TABLET ORAL DAILY
Qty: 90 TABLET | Refills: 1 | Status: SHIPPED | OUTPATIENT
Start: 2024-08-21

## 2024-08-22 DIAGNOSIS — K21.9 GASTROESOPHAGEAL REFLUX DISEASE WITHOUT ESOPHAGITIS: ICD-10-CM

## 2024-08-22 RX ORDER — FAMOTIDINE 40 MG/1
40 TABLET, FILM COATED ORAL DAILY
Qty: 90 TABLET | Refills: 1 | Status: SHIPPED | OUTPATIENT
Start: 2024-08-22 | End: 2025-02-18

## 2024-09-10 ENCOUNTER — APPOINTMENT (OUTPATIENT)
Dept: PRIMARY CARE | Facility: CLINIC | Age: 84
End: 2024-09-10
Payer: MEDICARE

## 2024-09-23 ENCOUNTER — LAB (OUTPATIENT)
Dept: LAB | Facility: LAB | Age: 84
End: 2024-09-23
Payer: MEDICARE

## 2024-09-23 DIAGNOSIS — E05.90 THYROTOXICOSIS WITHOUT THYROID STORM, UNSPECIFIED THYROTOXICOSIS TYPE: ICD-10-CM

## 2024-09-23 DIAGNOSIS — E78.2 MIXED HYPERLIPIDEMIA: ICD-10-CM

## 2024-09-23 DIAGNOSIS — I10 BENIGN ESSENTIAL HYPERTENSION: ICD-10-CM

## 2024-09-23 LAB
ANION GAP SERPL CALC-SCNC: 12 MMOL/L (ref 10–20)
BUN SERPL-MCNC: 22 MG/DL (ref 6–23)
CALCIUM SERPL-MCNC: 9.4 MG/DL (ref 8.6–10.3)
CHLORIDE SERPL-SCNC: 105 MMOL/L (ref 98–107)
CHOLEST SERPL-MCNC: 188 MG/DL (ref 0–199)
CHOLESTEROL/HDL RATIO: 3.4
CO2 SERPL-SCNC: 27 MMOL/L (ref 21–32)
CREAT SERPL-MCNC: 0.82 MG/DL (ref 0.5–1.05)
EGFRCR SERPLBLD CKD-EPI 2021: 71 ML/MIN/1.73M*2
GLUCOSE SERPL-MCNC: 81 MG/DL (ref 74–99)
HDLC SERPL-MCNC: 54.5 MG/DL
LDLC SERPL CALC-MCNC: 116 MG/DL
NON HDL CHOLESTEROL: 134 MG/DL (ref 0–149)
POTASSIUM SERPL-SCNC: 3.9 MMOL/L (ref 3.5–5.3)
SODIUM SERPL-SCNC: 140 MMOL/L (ref 136–145)
T4 FREE SERPL-MCNC: 1.04 NG/DL (ref 0.61–1.12)
TRIGL SERPL-MCNC: 87 MG/DL (ref 0–149)
TSH SERPL-ACNC: 0.07 MIU/L (ref 0.44–3.98)
VLDL: 17 MG/DL (ref 0–40)

## 2024-09-23 PROCEDURE — 80048 BASIC METABOLIC PNL TOTAL CA: CPT

## 2024-09-23 PROCEDURE — 84443 ASSAY THYROID STIM HORMONE: CPT

## 2024-09-23 PROCEDURE — 36415 COLL VENOUS BLD VENIPUNCTURE: CPT

## 2024-09-23 PROCEDURE — 84439 ASSAY OF FREE THYROXINE: CPT

## 2024-09-23 PROCEDURE — 80061 LIPID PANEL: CPT

## 2024-09-24 ENCOUNTER — APPOINTMENT (OUTPATIENT)
Dept: PRIMARY CARE | Facility: CLINIC | Age: 84
End: 2024-09-24
Payer: MEDICARE

## 2024-09-26 ENCOUNTER — APPOINTMENT (OUTPATIENT)
Dept: PRIMARY CARE | Facility: CLINIC | Age: 84
End: 2024-09-26
Payer: MEDICARE

## 2024-10-07 ENCOUNTER — APPOINTMENT (OUTPATIENT)
Dept: PRIMARY CARE | Facility: CLINIC | Age: 84
End: 2024-10-07
Payer: MEDICARE

## 2024-10-07 VITALS
HEIGHT: 59 IN | DIASTOLIC BLOOD PRESSURE: 70 MMHG | TEMPERATURE: 97.6 F | SYSTOLIC BLOOD PRESSURE: 120 MMHG | OXYGEN SATURATION: 95 % | BODY MASS INDEX: 39.76 KG/M2 | HEART RATE: 71 BPM | WEIGHT: 197.2 LBS

## 2024-10-07 DIAGNOSIS — I10 BENIGN ESSENTIAL HYPERTENSION: Primary | ICD-10-CM

## 2024-10-07 DIAGNOSIS — Z23 ENCOUNTER FOR IMMUNIZATION: ICD-10-CM

## 2024-10-07 DIAGNOSIS — E05.90 THYROTOXICOSIS WITHOUT THYROID STORM, UNSPECIFIED THYROTOXICOSIS TYPE: ICD-10-CM

## 2024-10-07 DIAGNOSIS — K59.04 CHRONIC IDIOPATHIC CONSTIPATION: ICD-10-CM

## 2024-10-07 DIAGNOSIS — Z00.00 ROUTINE GENERAL MEDICAL EXAMINATION AT HEALTH CARE FACILITY: ICD-10-CM

## 2024-10-07 DIAGNOSIS — K21.9 GASTROESOPHAGEAL REFLUX DISEASE WITHOUT ESOPHAGITIS: ICD-10-CM

## 2024-10-07 DIAGNOSIS — H61.23 BILATERAL IMPACTED CERUMEN: ICD-10-CM

## 2024-10-07 DIAGNOSIS — E87.6 HYPOKALEMIA: ICD-10-CM

## 2024-10-07 DIAGNOSIS — M19.91 LOCALIZED, PRIMARY OSTEOARTHRITIS: ICD-10-CM

## 2024-10-07 DIAGNOSIS — E78.2 MIXED HYPERLIPIDEMIA: ICD-10-CM

## 2024-10-07 DIAGNOSIS — E66.01 MORBID OBESITY (MULTI): ICD-10-CM

## 2024-10-07 PROCEDURE — 1170F FXNL STATUS ASSESSED: CPT | Performed by: PHYSICIAN ASSISTANT

## 2024-10-07 PROCEDURE — 90662 IIV NO PRSV INCREASED AG IM: CPT | Performed by: PHYSICIAN ASSISTANT

## 2024-10-07 PROCEDURE — G0008 ADMIN INFLUENZA VIRUS VAC: HCPCS | Performed by: PHYSICIAN ASSISTANT

## 2024-10-07 PROCEDURE — G0439 PPPS, SUBSEQ VISIT: HCPCS | Performed by: PHYSICIAN ASSISTANT

## 2024-10-07 PROCEDURE — 1159F MED LIST DOCD IN RCRD: CPT | Performed by: PHYSICIAN ASSISTANT

## 2024-10-07 PROCEDURE — 1158F ADVNC CARE PLAN TLK DOCD: CPT | Performed by: PHYSICIAN ASSISTANT

## 2024-10-07 PROCEDURE — 1160F RVW MEDS BY RX/DR IN RCRD: CPT | Performed by: PHYSICIAN ASSISTANT

## 2024-10-07 PROCEDURE — 99214 OFFICE O/P EST MOD 30 MIN: CPT | Performed by: PHYSICIAN ASSISTANT

## 2024-10-07 PROCEDURE — 3074F SYST BP LT 130 MM HG: CPT | Performed by: PHYSICIAN ASSISTANT

## 2024-10-07 PROCEDURE — 3078F DIAST BP <80 MM HG: CPT | Performed by: PHYSICIAN ASSISTANT

## 2024-10-07 PROCEDURE — 1123F ACP DISCUSS/DSCN MKR DOCD: CPT | Performed by: PHYSICIAN ASSISTANT

## 2024-10-07 RX ORDER — HYDROCHLOROTHIAZIDE 25 MG/1
50 TABLET ORAL DAILY
Qty: 180 TABLET | Refills: 1 | Status: SHIPPED | OUTPATIENT
Start: 2024-10-07

## 2024-10-07 RX ORDER — FAMOTIDINE 40 MG/1
40 TABLET, FILM COATED ORAL DAILY
Qty: 90 TABLET | Refills: 1 | Status: SHIPPED | OUTPATIENT
Start: 2024-10-07 | End: 2025-04-05

## 2024-10-07 RX ORDER — LISINOPRIL 40 MG/1
40 TABLET ORAL DAILY
Qty: 90 TABLET | Refills: 1 | Status: SHIPPED | OUTPATIENT
Start: 2024-10-07

## 2024-10-07 ASSESSMENT — ACTIVITIES OF DAILY LIVING (ADL)
DOING_HOUSEWORK: INDEPENDENT
BATHING: INDEPENDENT
GROCERY_SHOPPING: INDEPENDENT
TAKING_MEDICATION: INDEPENDENT
MANAGING_FINANCES: INDEPENDENT
DRESSING: INDEPENDENT

## 2024-10-07 ASSESSMENT — PATIENT HEALTH QUESTIONNAIRE - PHQ9
1. LITTLE INTEREST OR PLEASURE IN DOING THINGS: NOT AT ALL
SUM OF ALL RESPONSES TO PHQ9 QUESTIONS 1 AND 2: 0
2. FEELING DOWN, DEPRESSED OR HOPELESS: NOT AT ALL

## 2024-10-07 ASSESSMENT — ENCOUNTER SYMPTOMS
SHORTNESS OF BREATH: 0
ABDOMINAL PAIN: 0

## 2024-10-07 NOTE — PATIENT INSTRUCTIONS
Follow up with endocrinologist, Dr Mario as directed for your thyroid.           Ways to Help Prevent Falls at Home    Quick Tips   ? Ask for help if you need it. Most people want to help!   ? Get up slowly after sitting or laying down   ? Wear a medical alert device or keep cell phone in your pocket   ? Use night lights, especially areas near a bathroom   ? Keep the items you use often within reach on a small stool or end table   ? Use an assistive device such as walker or cane, as directed by provider/physical therapy   ? Use a non-slip mat and grab bars in your bathroom. Look for home health sections for best options     Other Areas to Focus On   ? Exercise and nutrition: Regular exercise or taking a falls prevention class are great ways improve strength and balance. Don’t forget to stay hydrated and bring a snack!   ? Medicine side effects: Some medicines can make you sleepy or dizzy, which could cause a fall. Ask your healthcare provider about the side effects your medicines could cause. Be sure to let them know if you take any vitamins or supplements as well.   ? Tripping hazards: Remove items you could trip on, such as loose mats, rugs, cords, and clutter. Wear closed toe shoes with rubber soles.   ? Health and wellness: Get regular checkups with your healthcare provider, plus routine vision and hearing screenings. Talk with your healthcare provider about:   o Your medicines and the possible side effects - bring them in a bag if that is easier!   o Problems with balance or feeling dizzy   o Ways to promote bone health, such as Vitamin D and calcium supplements   o Questions or concerns about falling     *Ask your healthcare team if you have questions     ©[facility], 2022

## 2024-10-07 NOTE — PROGRESS NOTES
Subjective   Reason for Visit: Amena Rubio is an 84 y.o. female here for a Medicare Wellness visit.     Past Medical, Surgical, and Family History reviewed and updated in chart.         HPI      Annual Wellness visit.     Reviewed patient's answers to all Medicare screening questionnaire questions regarding falls, depression, general health status, nutrition and exercise, functional ability/level of safety, home safety risks, and ADLs/IADLs.     Healthcare POA and Living Will status: yes, has in place (Joe, son).     Reviewed meds and discussed consistent use of meds as prescribed. Is not taking any high risk controlled/opioid medications.    No bowel or bladder incontinence.      Colonoscopy:  Last colonoscopy January 2022, Dr Catherine. Next one in 5 years (2027).   Mammogram:  no longer needed due to age.   Last DEXA scan: 5/2023 was nml     The patient has not felt down, felt depressed, felt hopeless, felt socially isolated or had little interest or pleasure in doing things over the past 2 weeks. No trouble with bathing, communicating, using the phone, dressing, eating, preparing meals, grooming, walking, toileting, house work, managing money, medications, shopping or transportation. Reports single fall the past year -- no significant injury (lost balance).  The home does have grab bars in the bathroom. Has handrails on the stairs. The home does not have poor lighting or loose rugs. Has mild hearing difficulty if there is a lot of background noise.  No diet restrictions. Eats well balanced diet.  No recent hospitalizations.    Feels health is fair.      Chronic pain (pain scale 5/10) -- DJD.    Regularly exercises - walks a lot.     No cognitive impairment.     No DM or nephropathy.     Discussed getting yearly flu shots (given today).       Patient Care Team:  Debby Jauregui MD as PCP - General (Family Medicine)  Ysabel Funk DO as PCP - [employer] PCP   Dermatology:  "  Endocrinologist: Dr Mario.     Review of Systems   Respiratory:  Negative for shortness of breath.    Cardiovascular:  Negative for chest pain.   Gastrointestinal:  Negative for abdominal pain.       Objective   Vitals:  /70   Pulse 71   Temp 36.4 °C (97.6 °F)   Ht 1.5 m (4' 11.06\")   Wt 89.4 kg (197 lb 3.2 oz)   SpO2 95%   BMI 39.76 kg/m²       Physical Exam  Vitals and nursing note reviewed.   Constitutional:       Appearance: Normal appearance. She is well-developed.   Eyes:      General: No scleral icterus.  Cardiovascular:      Rate and Rhythm: Normal rate and regular rhythm.   Pulmonary:      Effort: Pulmonary effort is normal.      Breath sounds: Normal breath sounds.   Abdominal:      Palpations: Abdomen is soft. There is no mass.      Tenderness: There is no abdominal tenderness.   Musculoskeletal:      Cervical back: Neck supple.   Skin:     General: Skin is warm and dry.   Neurological:      Mental Status: She is alert.         Assessment & Plan  Benign essential hypertension    Orders:    hydroCHLOROthiazide (HYDRODiuril) 25 mg tablet; Take 2 tablets (50 mg) by mouth once daily.    lisinopril 40 mg tablet; Take 1 tablet (40 mg) by mouth once daily.    Comprehensive Metabolic Panel; Future    Mixed hyperlipidemia    Orders:    Comprehensive Metabolic Panel; Future    Lipid Panel; Future    Gastroesophageal reflux disease without esophagitis    Orders:    famotidine (Pepcid) 40 mg tablet; Take 1 tablet (40 mg) by mouth once daily.    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 Discussed using caution to reduce risks for falls.   Flu shot given.   Recommend getting COVID booster and RSV vaccine at pharmacy.

## 2024-10-07 NOTE — ASSESSMENT & PLAN NOTE
Orders:    hydroCHLOROthiazide (HYDRODiuril) 25 mg tablet; Take 2 tablets (50 mg) by mouth once daily.    lisinopril 40 mg tablet; Take 1 tablet (40 mg) by mouth once daily.    Comprehensive Metabolic Panel; Future

## 2024-10-07 NOTE — PROGRESS NOTES
"Subjective   Patient ID: Amena Rubio is a 84 y.o. female who presents for Medicare Annual Wellness Visit Subsequent, Hypertension, and Hyperlipidemia (Review labs ).    HPI     Recheck of HTN, hypokalemia, hyperlipidemia, GERD. And Medicare AWE.   Previously was seeing Dr Funk as PCP.       HTN: Controlled. Taking and tolerating lisinopril 40 mg and HCTZ 25 mg daily.      Hypokalemia: Controlled with taking potassium 20 mEq every day.      Lab Results   Component Value Date    K 3.9 09/23/2024       Hyperthyroidism: Continue to follow with endocrinology, Dr Mario. Was there 4/16/24.  Taking methimazole 10 mg  Thyroid Stimulating Hormone (mIU/L)   Date Value   09/23/2024 0.07 (L)   04/16/2024 0.04 (L)     Thyroxine, Free (ng/dL)   Date Value   09/23/2024 1.04   04/16/2024 0.91          Hyperlipidemia:  Worsened.  Doing fair with diet.   LDL Calculated (mg/dL)   Date Value   09/23/2024 116 (H)   03/08/2024 95     LDL (mg/dL)   Date Value   09/11/2023 101 (H)   03/14/2023 102 (H)     Triglycerides (mg/dL)   Date Value   09/23/2024 87   03/08/2024 86     HDL-Cholesterol (mg/dL)   Date Value   09/23/2024 54.5   03/08/2024 48.1         DJD:  Controlled with Tylenol.     GERD: Controlled with famotidine 40 mg     Colitis/chronic constipation: has been seen by GI, improved with budesonide. Trying to adhere to diet -- intolerant to lactose     Preventative  Colonoscopy: Last colonoscopy January 2022, Dr Catherine, next one in 5 years -- 2027  Vaccine: Pneumovax 2009, Shingrix 2020  Bone density: Last DEXA scan 5/2023 is nml      Care team  Dermatology:   Endo Dr Mario     reports that she has never smoked. She has never used smokeless tobacco.      Review of Systems   Respiratory:  Negative for shortness of breath.    Cardiovascular:  Negative for chest pain.   Gastrointestinal:  Negative for abdominal pain.       Objective   /70   Pulse 71   Temp 36.4 °C (97.6 °F)   Ht 1.5 m (4' 11.06\")   Wt 89.4 kg " (197 lb 3.2 oz)   SpO2 95%   BMI 39.76 kg/m²     Physical Exam  Vitals and nursing note reviewed.   Constitutional:       Appearance: Normal appearance. She is well-developed.   HENT:      Ears:      Comments: Bilateral canals with cerumen impaction cleared with ear lavage.   Eyes:      General: No scleral icterus.  Cardiovascular:      Rate and Rhythm: Normal rate and regular rhythm.   Pulmonary:      Effort: Pulmonary effort is normal.      Breath sounds: Normal breath sounds.   Abdominal:      Palpations: Abdomen is soft. There is no mass.      Tenderness: There is no abdominal tenderness.   Musculoskeletal:      Cervical back: Neck supple.   Skin:     General: Skin is warm and dry.   Neurological:      Mental Status: She is alert.         Assessment/Plan   Diagnoses and all orders for this visit:  Benign essential hypertension  -     hydroCHLOROthiazide (HYDRODiuril) 25 mg tablet; Take 2 tablets (50 mg) by mouth once daily.  -     lisinopril 40 mg tablet; Take 1 tablet (40 mg) by mouth once daily.  -     Comprehensive Metabolic Panel; Future  Mixed hyperlipidemia  -     Comprehensive Metabolic Panel; Future  -     Lipid Panel; Future  Gastroesophageal reflux disease without esophagitis  -     famotidine (Pepcid) 40 mg tablet; Take 1 tablet (40 mg) by mouth once daily.  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   Patient was identified as a fall risk. Risk prevention instructions provided.  Follow up with endocrinologist.   Ear lavage done bilaterally.   Reviewed labs.   Refilled meds.   Follow up  no smoking or alcohol use in 6 months with Dr Debby Jauregui for Recheck of HTN, hypokalemia, hyperlipidemia, GERD.

## 2024-10-13 ASSESSMENT — ENCOUNTER SYMPTOMS
SHORTNESS OF BREATH: 0
ABDOMINAL PAIN: 0

## 2025-02-20 DIAGNOSIS — E05.90 THYROTOXICOSIS WITHOUT THYROID STORM, UNSPECIFIED THYROTOXICOSIS TYPE: ICD-10-CM

## 2025-02-20 RX ORDER — METHIMAZOLE 10 MG/1
TABLET ORAL
Qty: 90 TABLET | Refills: 1 | Status: SHIPPED | OUTPATIENT
Start: 2025-02-20 | End: 2025-02-21 | Stop reason: SDUPTHER

## 2025-02-20 NOTE — TELEPHONE ENCOUNTER
Pt called rx line @ 2:09pm requesting RF on Methimazole. [facility]    Next OV 4/7/25 with [facility]  Last sent on 9/20/23 by EOI  Last seen by CW on 10/7/24  Please advise. Thanks. JW

## 2025-02-21 RX ORDER — METHIMAZOLE 10 MG/1
TABLET ORAL
Qty: 30 TABLET | Refills: 0 | Status: SHIPPED | OUTPATIENT
Start: 2025-02-21

## 2025-02-21 NOTE — TELEPHONE ENCOUNTER
Amena called back stating she will be out of this medication tomorrow.  Pt is requesting a short supply until 4/7 OV.  OK for refill since pt will be out tomorrow? Thanks, CG

## 2025-04-01 LAB
ALBUMIN SERPL-MCNC: 3.8 G/DL (ref 3.6–5.1)
ALP SERPL-CCNC: 56 U/L (ref 37–153)
ALT SERPL-CCNC: 12 U/L (ref 6–29)
ANION GAP SERPL CALCULATED.4IONS-SCNC: 8 MMOL/L (CALC) (ref 7–17)
AST SERPL-CCNC: 18 U/L (ref 10–35)
BILIRUB SERPL-MCNC: 1 MG/DL (ref 0.2–1.2)
BUN SERPL-MCNC: 15 MG/DL (ref 7–25)
CALCIUM SERPL-MCNC: 9.4 MG/DL (ref 8.6–10.4)
CHLORIDE SERPL-SCNC: 104 MMOL/L (ref 98–110)
CHOLEST SERPL-MCNC: 184 MG/DL
CHOLEST/HDLC SERPL: 3.5 (CALC)
CO2 SERPL-SCNC: 30 MMOL/L (ref 20–32)
CREAT SERPL-MCNC: 0.85 MG/DL (ref 0.6–0.95)
EGFRCR SERPLBLD CKD-EPI 2021: 68 ML/MIN/1.73M2
GLUCOSE SERPL-MCNC: 71 MG/DL (ref 65–99)
HDLC SERPL-MCNC: 53 MG/DL
LDLC SERPL CALC-MCNC: 112 MG/DL (CALC)
NONHDLC SERPL-MCNC: 131 MG/DL (CALC)
POTASSIUM SERPL-SCNC: 4.1 MMOL/L (ref 3.5–5.3)
PROT SERPL-MCNC: 6.2 G/DL (ref 6.1–8.1)
SODIUM SERPL-SCNC: 142 MMOL/L (ref 135–146)
TRIGL SERPL-MCNC: 87 MG/DL

## 2025-04-07 ENCOUNTER — APPOINTMENT (OUTPATIENT)
Dept: PRIMARY CARE | Facility: CLINIC | Age: 85
End: 2025-04-07
Payer: MEDICARE

## 2025-04-07 VITALS
HEART RATE: 65 BPM | BODY MASS INDEX: 40.72 KG/M2 | TEMPERATURE: 96.2 F | WEIGHT: 202 LBS | DIASTOLIC BLOOD PRESSURE: 82 MMHG | OXYGEN SATURATION: 98 % | SYSTOLIC BLOOD PRESSURE: 124 MMHG

## 2025-04-07 DIAGNOSIS — E05.80 OTHER THYROTOXICOSIS WITHOUT THYROTOXIC CRISIS OR STORM: Primary | ICD-10-CM

## 2025-04-07 DIAGNOSIS — I10 BENIGN ESSENTIAL HYPERTENSION: ICD-10-CM

## 2025-04-07 DIAGNOSIS — E78.2 MIXED HYPERLIPIDEMIA: ICD-10-CM

## 2025-04-07 DIAGNOSIS — E87.6 HYPOKALEMIA: ICD-10-CM

## 2025-04-07 DIAGNOSIS — E05.90 THYROTOXICOSIS WITHOUT THYROID STORM, UNSPECIFIED THYROTOXICOSIS TYPE: ICD-10-CM

## 2025-04-07 DIAGNOSIS — K21.9 GASTROESOPHAGEAL REFLUX DISEASE WITHOUT ESOPHAGITIS: ICD-10-CM

## 2025-04-07 PROCEDURE — 1036F TOBACCO NON-USER: CPT | Performed by: FAMILY MEDICINE

## 2025-04-07 PROCEDURE — 99214 OFFICE O/P EST MOD 30 MIN: CPT | Performed by: FAMILY MEDICINE

## 2025-04-07 PROCEDURE — 3074F SYST BP LT 130 MM HG: CPT | Performed by: FAMILY MEDICINE

## 2025-04-07 PROCEDURE — G2211 COMPLEX E/M VISIT ADD ON: HCPCS | Performed by: FAMILY MEDICINE

## 2025-04-07 PROCEDURE — 3079F DIAST BP 80-89 MM HG: CPT | Performed by: FAMILY MEDICINE

## 2025-04-07 PROCEDURE — 1159F MED LIST DOCD IN RCRD: CPT | Performed by: FAMILY MEDICINE

## 2025-04-07 PROCEDURE — 1160F RVW MEDS BY RX/DR IN RCRD: CPT | Performed by: FAMILY MEDICINE

## 2025-04-07 PROCEDURE — 1123F ACP DISCUSS/DSCN MKR DOCD: CPT | Performed by: FAMILY MEDICINE

## 2025-04-07 RX ORDER — HYDROCHLOROTHIAZIDE 25 MG/1
25 TABLET ORAL DAILY
Qty: 180 TABLET | Refills: 1 | Status: SHIPPED | OUTPATIENT
Start: 2025-04-07

## 2025-04-07 RX ORDER — METHIMAZOLE 10 MG/1
TABLET ORAL
Qty: 90 TABLET | Refills: 1 | Status: SHIPPED | OUTPATIENT
Start: 2025-04-07

## 2025-04-07 RX ORDER — POTASSIUM CHLORIDE 1500 MG/1
20 TABLET, EXTENDED RELEASE ORAL DAILY
Qty: 90 TABLET | Refills: 1 | Status: SHIPPED | OUTPATIENT
Start: 2025-04-07 | End: 2025-10-04

## 2025-04-07 RX ORDER — FAMOTIDINE 40 MG/1
40 TABLET, FILM COATED ORAL DAILY
Qty: 90 TABLET | Refills: 1 | Status: SHIPPED | OUTPATIENT
Start: 2025-04-07 | End: 2025-10-04

## 2025-04-07 RX ORDER — AMLODIPINE BESYLATE 5 MG/1
5 TABLET ORAL DAILY
Qty: 90 TABLET | Refills: 1 | Status: SHIPPED | OUTPATIENT
Start: 2025-04-07 | End: 2025-10-04

## 2025-04-07 RX ORDER — LISINOPRIL 40 MG/1
40 TABLET ORAL DAILY
Qty: 90 TABLET | Refills: 1 | Status: SHIPPED | OUTPATIENT
Start: 2025-04-07

## 2025-04-07 RX ORDER — POTASSIUM CHLORIDE 1500 MG/1
20 TABLET, EXTENDED RELEASE ORAL DAILY
COMMUNITY
Start: 2025-03-10 | End: 2025-04-07 | Stop reason: SDUPTHER

## 2025-04-07 ASSESSMENT — ENCOUNTER SYMPTOMS
SLEEP DISTURBANCE: 0
APPETITE CHANGE: 0
ACTIVITY CHANGE: 0
SHORTNESS OF BREATH: 0
ABDOMINAL PAIN: 0
DIZZINESS: 0
CHILLS: 0
LIGHT-HEADEDNESS: 0
FEVER: 0

## 2025-04-07 NOTE — PROGRESS NOTES
Subjective   Patient ID: Amena Rubio is a 84 y.o. female who presents for GERD, Hypertension, and Hyperlipidemia (Review BW).    HPI   HTN:   Controlled with lisinopril 40 mg, HCTZ 50 mg daily  Ha sbeen peeing too much  Affects her daily life  Denied dysuria, hematuria   Does not check bp at home      Hypokalemia:   Controlled with potassium 20 mEq  Normal     Hyperthyroid   -on methimazole 10 mg  Sees endo but lost to follow up   Has had thyroid uptake scan in the past     DJD:   - Controlled with Tylenol l     GERD:   -Controlled with famotidine 40 mg     Colitis:   - has been seen by GI, improved with budesonide, trying to adhere intolerant to lactose     Orders Only on 03/31/2025   Component Date Value Ref Range Status    CHOLESTEROL, TOTAL 03/31/2025 184  <200 mg/dL Final    HDL CHOLESTEROL 03/31/2025 53  > OR = 50 mg/dL Final    TRIGLYCERIDES 03/31/2025 87  <150 mg/dL Final    LDL-CHOLESTEROL 03/31/2025 112 (H)  mg/dL (calc) Final    CHOL/HDLC RATIO 03/31/2025 3.5  <5.0 (calc) Final    NON HDL CHOLESTEROL 03/31/2025 131 (H)  <130 mg/dL (calc) Final    GLUCOSE 03/31/2025 71  65 - 99 mg/dL Final    UREA NITROGEN (BUN) 03/31/2025 15  7 - 25 mg/dL Final    CREATININE 03/31/2025 0.85  0.60 - 0.95 mg/dL Final    EGFR 03/31/2025 68  > OR = 60 mL/min/1.73m2 Final    SODIUM 03/31/2025 142  135 - 146 mmol/L Final    POTASSIUM 03/31/2025 4.1  3.5 - 5.3 mmol/L Final    CHLORIDE 03/31/2025 104  98 - 110 mmol/L Final    CARBON DIOXIDE 03/31/2025 30  20 - 32 mmol/L Final    ELECTROLYTE BALANCE 03/31/2025 8  7 - 17 mmol/L (calc) Final    CALCIUM 03/31/2025 9.4  8.6 - 10.4 mg/dL Final    PROTEIN, TOTAL 03/31/2025 6.2  6.1 - 8.1 g/dL Final    ALBUMIN 03/31/2025 3.8  3.6 - 5.1 g/dL Final    BILIRUBIN, TOTAL 03/31/2025 1.0  0.2 - 1.2 mg/dL Final    ALKALINE PHOSPHATASE 03/31/2025 56  37 - 153 U/L Final    AST 03/31/2025 18  10 - 35 U/L Final    ALT 03/31/2025 12  6 - 29 U/L Final       Review of Systems   Constitutional:   Negative for activity change, appetite change, chills and fever.   Eyes:  Negative for visual disturbance.   Respiratory:  Negative for shortness of breath.    Cardiovascular:  Negative for chest pain.   Gastrointestinal:  Negative for abdominal pain.   Skin:  Negative for rash.   Neurological:  Negative for dizziness and light-headedness.   Psychiatric/Behavioral:  Negative for sleep disturbance.        Objective   /82   Pulse 65   Temp 35.7 °C (96.2 °F)   Wt 91.6 kg (202 lb)   SpO2 98%   BMI 40.72 kg/m²     Physical Exam  Constitutional:       Appearance: Normal appearance.   Eyes:      Pupils: Pupils are equal, round, and reactive to light.   Cardiovascular:      Rate and Rhythm: Normal rate and regular rhythm.   Pulmonary:      Effort: Pulmonary effort is normal.      Breath sounds: Normal breath sounds.   Abdominal:      General: Abdomen is flat. Bowel sounds are normal.      Palpations: Abdomen is soft.   Musculoskeletal:         General: Normal range of motion.   Skin:     General: Skin is warm.   Neurological:      General: No focal deficit present.      Mental Status: She is alert.   Psychiatric:         Mood and Affect: Mood normal.         Assessment/Plan   Assessment & Plan  Other thyrotoxicosis without thyrotoxic crisis or storm  Encouraged pt to make an appt with endo. Pt now agreeable. She was afraid she needed surgery but discsused that she does not   Orders:    TSH with reflex to Free T4 if abnormal; Future    T4, free; Future    Gastroesophageal reflux disease without esophagitis    Orders:    famotidine (Pepcid) 40 mg tablet; Take 1 tablet (40 mg) by mouth once daily.    Benign essential hypertension  Controlle dbut since [facility] hydrochlorothiazide, will dec dose to 25 mg a day. Pt also at risk for hyponatremia. Will start norvasc 5 mg. Aware this can cause swelling in LE. Will have a close follow up in 6 wks. Check bp 1 wk prior to appt. Will call if there are any issues    Orders:    hydroCHLOROthiazide (HYDRODiuril) 25 mg tablet; Take 1 tablet (25 mg) by mouth once daily.    lisinopril 40 mg tablet; Take 1 tablet (40 mg) by mouth once daily.    amLODIPine (Norvasc) 5 mg tablet; Take 1 tablet (5 mg) by mouth once daily.    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    CBC and Auto Differential; Future    Comprehensive Metabolic Panel; Future    Lipid Panel; Future    Mixed hyperlipidemia  No meds at this time   Orders:    Lipid Panel; Future    Six wks follow up for htn   Six months follow up paulette

## 2025-04-07 NOTE — ASSESSMENT & PLAN NOTE
Encouraged pt to make an appt with endo. Pt now agreeable. She was afraid she needed surgery but discsused that she does not   Orders:    TSH with reflex to Free T4 if abnormal; Future    T4, free; Future

## 2025-04-07 NOTE — PATIENT INSTRUCTIONS
Only take 1 tab of hydrochlorothiazide a day. I started you on a new blood pressure med called amlodipine. No blood work needed prior to our appt in 6 wks     Please make an appointment with Dr Mario (endocrinology)   Address: [address] [place] [address], [place] [address]  Phone: [phone]

## 2025-04-07 NOTE — ASSESSMENT & PLAN NOTE
Controlle dbut since [facility] hydrochlorothiazide, will dec dose to 25 mg a day. Pt also at risk for hyponatremia. Will start norvasc 5 mg. Aware this can cause swelling in LE. Will have a close follow up in 6 wks. Check bp 1 wk prior to appt. Will call if there are any issues   Orders:    hydroCHLOROthiazide (HYDRODiuril) 25 mg tablet; Take 1 tablet (25 mg) by mouth once daily.    lisinopril 40 mg tablet; Take 1 tablet (40 mg) by mouth once daily.    amLODIPine (Norvasc) 5 mg tablet; Take 1 tablet (5 mg) by mouth once daily.    CBC and Auto Differential; Future    Comprehensive Metabolic Panel; Future    Lipid Panel; Future

## 2025-04-07 NOTE — ASSESSMENT & PLAN NOTE
Orders:    methIMAzole (Tapazole) 10 mg tablet; Take half tab S,m,w,thurs,f Pt will call when needed    CBC and Auto Differential; Future    Comprehensive Metabolic Panel; Future    Lipid Panel; Future

## 2025-04-07 NOTE — ASSESSMENT & PLAN NOTE
No meds at this time   Orders:    Lipid Panel; Future    Six wks follow up for htn   Six months follow up awv

## 2025-04-10 ENCOUNTER — TELEPHONE (OUTPATIENT)
Dept: PRIMARY CARE | Facility: CLINIC | Age: 85
End: 2025-04-10
Payer: MEDICARE

## 2025-04-22 ENCOUNTER — TELEPHONE (OUTPATIENT)
Dept: PRIMARY CARE | Facility: CLINIC | Age: 85
End: 2025-04-22
Payer: MEDICARE

## 2025-04-22 DIAGNOSIS — I10 BENIGN ESSENTIAL HYPERTENSION: ICD-10-CM

## 2025-04-22 RX ORDER — HYDROCHLOROTHIAZIDE 25 MG/1
50 TABLET ORAL DAILY
Qty: 180 TABLET | Refills: 1 | Status: SHIPPED | OUTPATIENT
Start: 2025-04-22

## 2025-04-22 NOTE — TELEPHONE ENCOUNTER
Pt called stating she has decided to not start the amlodipine. She doesn't like the side effects that can occur. She's asking for the rx to be cancelled at this time. Pt also asking for her hydrochlorothiazide to go back to 2 a day instead of 1 a day.   Please advise

## 2025-05-13 ENCOUNTER — HOSPITAL ENCOUNTER (OUTPATIENT)
Dept: RADIOLOGY | Facility: CLINIC | Age: 85
Discharge: HOME | End: 2025-05-13
Payer: MEDICARE

## 2025-05-13 DIAGNOSIS — M25.572 PAIN IN LEFT ANKLE AND JOINTS OF LEFT FOOT: ICD-10-CM

## 2025-05-13 PROCEDURE — 73630 X-RAY EXAM OF FOOT: CPT | Mod: LT

## 2025-05-13 PROCEDURE — 73610 X-RAY EXAM OF ANKLE: CPT | Mod: LEFT SIDE | Performed by: RADIOLOGY

## 2025-05-13 PROCEDURE — 73610 X-RAY EXAM OF ANKLE: CPT | Mod: LT

## 2025-05-20 ENCOUNTER — APPOINTMENT (OUTPATIENT)
Dept: PRIMARY CARE | Facility: CLINIC | Age: 85
End: 2025-05-20
Payer: MEDICARE

## 2025-06-05 ENCOUNTER — APPOINTMENT (OUTPATIENT)
Dept: PRIMARY CARE | Facility: CLINIC | Age: 85
End: 2025-06-05
Payer: MEDICARE

## 2025-06-05 VITALS
BODY MASS INDEX: 40.32 KG/M2 | SYSTOLIC BLOOD PRESSURE: 108 MMHG | DIASTOLIC BLOOD PRESSURE: 62 MMHG | WEIGHT: 200 LBS | HEART RATE: 71 BPM | TEMPERATURE: 98 F | OXYGEN SATURATION: 96 %

## 2025-06-05 DIAGNOSIS — E66.01 MORBID OBESITY (MULTI): ICD-10-CM

## 2025-06-05 DIAGNOSIS — Z00.00 ROUTINE GENERAL MEDICAL EXAMINATION AT HEALTH CARE FACILITY: Primary | ICD-10-CM

## 2025-06-05 DIAGNOSIS — E87.6 HYPOKALEMIA: ICD-10-CM

## 2025-06-05 DIAGNOSIS — I10 BENIGN ESSENTIAL HYPERTENSION: ICD-10-CM

## 2025-06-05 DIAGNOSIS — H61.23 BILATERAL IMPACTED CERUMEN: ICD-10-CM

## 2025-06-05 RX ORDER — HYDROCHLOROTHIAZIDE 25 MG/1
50 TABLET ORAL DAILY
Qty: 180 TABLET | Refills: 1 | Status: CANCELLED | OUTPATIENT
Start: 2025-06-05

## 2025-06-05 RX ORDER — POTASSIUM CHLORIDE 20 MEQ/1
20 TABLET, EXTENDED RELEASE ORAL 2 TIMES DAILY
Qty: 180 TABLET | Refills: 1 | Status: CANCELLED | OUTPATIENT
Start: 2025-06-05 | End: 2025-12-02

## 2025-06-05 RX ORDER — LISINOPRIL 40 MG/1
40 TABLET ORAL DAILY
Qty: 90 TABLET | Refills: 1 | Status: CANCELLED | OUTPATIENT
Start: 2025-06-05

## 2025-06-05 ASSESSMENT — ENCOUNTER SYMPTOMS
CHILLS: 0
FEVER: 0
NUMBNESS: 0
BLOOD IN STOOL: 0
VOMITING: 0
FREQUENCY: 0
SHORTNESS OF BREATH: 0
HEADACHES: 0
TROUBLE SWALLOWING: 0
ARTHRALGIAS: 0
DIZZINESS: 0
CONSTIPATION: 0
APPETITE CHANGE: 0
ABDOMINAL PAIN: 0
DYSURIA: 0
HEMATURIA: 0
LIGHT-HEADEDNESS: 0
SLEEP DISTURBANCE: 0
NAUSEA: 0
ACTIVITY CHANGE: 0
DIARRHEA: 0
WEAKNESS: 0

## 2025-06-05 ASSESSMENT — ACTIVITIES OF DAILY LIVING (ADL)
TAKING_MEDICATION: INDEPENDENT
DRESSING: INDEPENDENT
GROCERY_SHOPPING: NEEDS ASSISTANCE
DOING_HOUSEWORK: INDEPENDENT
BATHING: INDEPENDENT
MANAGING_FINANCES: INDEPENDENT

## 2025-06-05 ASSESSMENT — PATIENT HEALTH QUESTIONNAIRE - PHQ9
SUM OF ALL RESPONSES TO PHQ9 QUESTIONS 1 AND 2: 0
1. LITTLE INTEREST OR PLEASURE IN DOING THINGS: NOT AT ALL
2. FEELING DOWN, DEPRESSED OR HOPELESS: NOT AT ALL

## 2025-06-05 NOTE — ASSESSMENT & PLAN NOTE
Will have pt take 1 tab of hydrochlorothiazide 25 mg alternating with 2 tabs (50 mg) every other day. Patient will mychart msg in 1 wk and let us know what her bp is. She is aware that her bp goal is 130/80. Will obtain labs next visit

## 2025-06-05 NOTE — ASSESSMENT & PLAN NOTE
encourage heart healthy & DASH diet; continue exercise as tolerated, at least 150 mins per wk

## 2025-06-05 NOTE — PROGRESS NOTES
Patient ID: Amena Rubio is a 85 y.o. female.    Subjective   Reason for Visit: Amena Rubio is an 85 y.o. female here for a Medicare Wellness visit.     Past Medical, Surgical, and Family History reviewed and updated in chart.    Reviewed all medications by prescribing practitioner or clinical pharmacist (such as prescriptions, OTCs, herbal therapies and supplements) and documented in the medical record.    HPI  HTN  Controlled with lisinopril 40 mg, HCTZ 50 mg daily  Urination improved since last visit   Never started the amlodipine that she was started on last visit since she was concerned about the leg swelling      Hypokalemia:   Controlled with potassium 20 mEq  Normal      Hyperthyroid   -on methimazole 10 mg  Sees endo but lost to follow up   Has had thyroid uptake scan in the past      DJD:   - Controlled with Tylenol l     GERD:   -Controlled with famotidine 40 mg     Colitis:   - has been seen by GI, improved with budesonide, trying to adhere intolerant to lactose    Patient ID: Amena Rubio is a 85 y.o. female.    Ear Cerumen Removal    Date/Time: 6/5/2025 1:36 PM    Performed by: Debby Jauregui MD  Authorized by: Debby Jauregui MD    Consent:     Consent obtained:  Verbal    Consent given by:  Patient    Risks discussed:  Pain, TM perforation, incomplete removal and dizziness    Alternatives discussed:  No treatment  Procedure details:     Location:  L ear and R ear    Procedure type: curette      Procedure outcomes: cerumen removed    Post-procedure details:     Inspection:  TM intact    Hearing quality:  Improved    Procedure completion:  Tolerated well, no immediate complications    Patient Care Team:  Debby Jauregui MD as PCP - General (Family Medicine)  Ysabel Funk DO as PCP - [employer] PCP     Review of Systems   Constitutional:  Negative for activity change, appetite change, chills and fever.   HENT:  Negative for hearing loss, tinnitus and trouble  swallowing.    Eyes:  Negative for visual disturbance.   Respiratory:  Negative for shortness of breath.    Cardiovascular:  Negative for chest pain.   Gastrointestinal:  Negative for abdominal pain, blood in stool, constipation, diarrhea, nausea and vomiting.   Genitourinary:  Negative for dysuria, frequency and hematuria.   Musculoskeletal:  Negative for arthralgias.   Skin:  Negative for rash.   Neurological:  Negative for dizziness, weakness, light-headedness, numbness and headaches.   Psychiatric/Behavioral:  Negative for sleep disturbance.        Objective   Vitals:  /62   Pulse 71   Temp 36.7 °C (98 °F)   Wt 90.7 kg (200 lb)   SpO2 96%   BMI 40.32 kg/m²       Physical Exam  Constitutional:       Appearance: Normal appearance.   HENT:      Head: Normocephalic and atraumatic.      Right Ear: Tympanic membrane normal.      Left Ear: Tympanic membrane normal.      Nose: Nose normal.      Mouth/Throat:      Mouth: Mucous membranes are moist.   Eyes:      Pupils: Pupils are equal, round, and reactive to light.   Neck:      Thyroid: No thyromegaly.   Cardiovascular:      Rate and Rhythm: Normal rate and regular rhythm.   Pulmonary:      Effort: Pulmonary effort is normal.      Breath sounds: Normal breath sounds.   Abdominal:      General: Abdomen is flat. Bowel sounds are normal. There is no distension.      Palpations: Abdomen is soft.      Tenderness: There is no guarding or rebound.   Musculoskeletal:         General: No swelling or deformity. Normal range of motion.      Cervical back: Normal range of motion.   Skin:     General: Skin is warm.   Neurological:      General: No focal deficit present.      Mental Status: She is alert.   Psychiatric:         Mood and Affect: Mood normal.         Assessment & Plan  Routine general medical examination at health care facility  Recommendations for women annual wellness exam:  Make sure screenings for cervical, breast, and colon cancer are up to date if  applicable- pap smears age 21-65, discuss mammogram starting at age 40, colonoscopy at age 45, earlier if positive family history for breast or colon cancer  Screen for osteoporosis with DXA bone scan starting at age 65 or sooner if risk factors present (long term steroid use, smoking, heavy alcohol use, history of fracture, rheumatoid arthritis, low body weight, family history of hip fracture)  Screening for lung cancer with low-dose CT in all adults age 55-77 who have a 30 pack-year smoking history and currently smoke or have quit within the past 15 years  Follow a healthy diet (Dash diet, Mediterranean diet)  Exercise 150 min/wk  Maintain healthy weight (BMI < 25)  Do not smoke  Alcohol in moderation (up to 1 drink/day)  Get enough sleep (7-8 hours/night)  Make sure immunizations are up to date (influenza, pneumococcal, Tdap, shingles if age > 50)  Postmenopausal women or women with osteoporosis need minimum 1,200 mg calcium and 800 IU vitamin D daily  Talk to your physician if you have concerns about depression or anxiety  Visit dentist twice yearly    Orders:    1 Year Follow Up In Primary Care - Wellness Exam; Future    Hypokalemia  Will check potassium next vsiit        Benign essential hypertension  Will have pt take 1 tab of hydrochlorothiazide 25 mg alternating with 2 tabs (50 mg) every other day. Patient will mychart msg in 1 wk and let us know what her bp is. She is aware that her bp goal is 130/80. Will obtain labs next visit        Bilateral impacted cerumen    Orders:    Ear Cerumen Removal    Morbid obesity (Multi)  encourage heart healthy & DASH diet; continue exercise as tolerated, at least 150 mins per wk          Body mass index (BMI) 40.0-44.9, adult (Multi)  encourage heart healthy & DASH diet; continue exercise as tolerated, at least 150 mins per wk

## 2025-06-05 NOTE — PATIENT INSTRUCTIONS
Continue taking 1 tab of the potassium twice a day    Please start taking hydrochlorothiazide 25 mg 1 tab alternating with 2 tabs every other day.  Your blood pressure was well-controlled but taking 50 mg of hydrochlorothiazide puts you at higher risk for low sodium level.  Please continue checking your blood pressure at home keeping in mind the goal of 130/80.  If your blood pressure is consistently above this, please continue taking 2 tabs of the hydrochlorothiazide and let me know if this happens.    Pls obtain fasting blood work 1 wk prior to next visit     We recommend getting the rsv vaccine from the local pharmacy

## 2025-07-28 NOTE — PROGRESS NOTES
"Subjective   Amena Rubio is a 85 y.o. female who presents for follow up for hyperthyroidism.     She states that her previous PCP suggested that she had Grave's disease.  Antibody testing was negative.      She had a thyroid uptake and scan in June 2024 which revealed borderline normal radioiodine uptake of the thyroid gland with nodular focus of uptake which corresponds left midpole thyroid nodule ultrasound 04/29/2024. Findings consistent with hyperfunctioning  thyroid nodule. Thyroid uptake is significantly elevated in  comparison the prior study and the left thyroid lobe/nodule appears to be more homogeneous compared to prior study. Note is made of relative suppression of the right thyroid lobe.    The patient does not want to repeat a thyroid ultrasound nor does she want to undergo surgery.      Current Reigmen  Methimazole 5mg once daily x 5 days     Symptoms are as listed below:  Energy levels -  good   Sleep -  awakenings due to heat   Temperature intolerances denies   Bowel movements -  varies between diarrhea and constipation   Hair changes -  admits to shedding; chronic for 5 years   Skin changes -  denies   Palpitations - denies  Diaphoresis -  denies  Tremors -denies   Dysphagia -admits with potassium pill   Dyspnea upon lying supine -  denies  Dysphonia -  denies   Weight changes -  lost      Review of Systems   HENT:  Positive for hearing loss.    Neurological:         Memory loss   All other systems reviewed and are negative.      Objective   /66   Pulse 68   Ht (!) 1.499 m (4' 11\")   Wt 89.3 kg (196 lb 12.8 oz)   BMI 39.75 kg/m²   Physical Exam  Vitals and nursing note reviewed.   Constitutional:       General: She is not in acute distress.     Appearance: Normal appearance. She is obese.   HENT:      Head: Normocephalic and atraumatic.      Nose: Nose normal.      Mouth/Throat:      Mouth: Mucous membranes are moist.     Eyes:      Extraocular Movements: Extraocular movements intact. "       Cardiovascular:      Rate and Rhythm: Normal rate and regular rhythm.   Pulmonary:      Effort: Pulmonary effort is normal.      Breath sounds: Normal breath sounds.     Musculoskeletal:         General: Normal range of motion.     Skin:     General: Skin is warm.     Neurological:      Mental Status: She is alert and oriented to person, place, and time.      Deep Tendon Reflexes: Reflexes normal.      Comments: No tremors to outstretched hands     Psychiatric:         Mood and Affect: Mood normal.          Lab Results   Component Value Date    TSH 0.16 (L) 08/01/2025    FREET4 1.2 08/01/2025     Imaging  === 06/26/24 ===    NM THYROID UPTAKE AND SCAN    - Impression -  1. Borderline normal radioiodine uptake of the thyroid gland with nodular focus of uptake which corresponds left midpole thyroid nodule ultrasound 04/29/2024. Findings consistent with hyperfunctioning thyroid nodule. Thyroid uptake is significantly elevated in comparison the prior study and the left thyroid lobe/nodule appears to be more homogeneous compared to prior study. Note is made of  relative suppression of the right thyroid lobe.    === 04/16/24 ===    US THYROID    - Impression -  1. Enlarged multinodular goiter compatible with chronic thyroid disease. Largest nodule seen in the left gland has rim calcified since prior examination. No growing nodules. This was biopsied previously      Assessment/Plan   85-year-old female presents for follow up for thyrotoxicosis.  She has an enlarged multinodular goiter for which she does not want a repeat ultrasound or surgical intervention.    Thyrotoxicosis  To continue Methimazole 5mg once daily for five days per week  To obtain blood tests today      Multinodular goiter  Patient does not desire to have a repeat thyroid ultrasound to assess for stability of thyroid nodules    For follow up in 6 months

## 2025-07-28 NOTE — PATIENT INSTRUCTIONS
Thank you for choosing [facility]  for your health care needs.  If you have any questions, concerns or medical needs, please feel free to contact our office at [phone].    Please ensure you complete your blood work one week before the next scheduled appointment.    To continue Methimazole 5mg once daily for five days per week  For follow up in 6 months

## 2025-07-31 DIAGNOSIS — I10 BENIGN ESSENTIAL HYPERTENSION: ICD-10-CM

## 2025-07-31 RX ORDER — LISINOPRIL 40 MG/1
40 TABLET ORAL
Qty: 90 TABLET | Refills: 1 | Status: SHIPPED | OUTPATIENT
Start: 2025-07-31

## 2025-08-01 ENCOUNTER — APPOINTMENT (OUTPATIENT)
Dept: ENDOCRINOLOGY | Facility: CLINIC | Age: 85
End: 2025-08-01
Payer: MEDICARE

## 2025-08-01 VITALS
HEART RATE: 68 BPM | BODY MASS INDEX: 39.68 KG/M2 | HEIGHT: 59 IN | DIASTOLIC BLOOD PRESSURE: 66 MMHG | WEIGHT: 196.8 LBS | SYSTOLIC BLOOD PRESSURE: 110 MMHG

## 2025-08-01 DIAGNOSIS — E05.90 THYROTOXICOSIS WITHOUT THYROID STORM, UNSPECIFIED THYROTOXICOSIS TYPE: Primary | ICD-10-CM

## 2025-08-01 DIAGNOSIS — E04.2 MULTINODULAR GOITER: ICD-10-CM

## 2025-08-01 PROCEDURE — 3074F SYST BP LT 130 MM HG: CPT | Performed by: INTERNAL MEDICINE

## 2025-08-01 PROCEDURE — 3078F DIAST BP <80 MM HG: CPT | Performed by: INTERNAL MEDICINE

## 2025-08-01 PROCEDURE — G2211 COMPLEX E/M VISIT ADD ON: HCPCS | Performed by: INTERNAL MEDICINE

## 2025-08-01 PROCEDURE — 1036F TOBACCO NON-USER: CPT | Performed by: INTERNAL MEDICINE

## 2025-08-01 PROCEDURE — 99214 OFFICE O/P EST MOD 30 MIN: CPT | Performed by: INTERNAL MEDICINE

## 2025-08-01 PROCEDURE — 1160F RVW MEDS BY RX/DR IN RCRD: CPT | Performed by: INTERNAL MEDICINE

## 2025-08-01 PROCEDURE — 1159F MED LIST DOCD IN RCRD: CPT | Performed by: INTERNAL MEDICINE

## 2025-08-02 LAB
ALBUMIN SERPL-MCNC: 4.1 G/DL (ref 3.6–5.1)
ALBUMIN/GLOB SERPL: 1.6 (CALC) (ref 1–2.5)
ALP SERPL-CCNC: 64 U/L (ref 37–153)
ALT SERPL-CCNC: 13 U/L (ref 6–29)
AST SERPL-CCNC: 18 U/L (ref 10–35)
BILIRUB DIRECT SERPL-MCNC: 0.2 MG/DL
BILIRUB INDIRECT SERPL-MCNC: 0.6 MG/DL (CALC) (ref 0.2–1.2)
BILIRUB SERPL-MCNC: 0.8 MG/DL (ref 0.2–1.2)
ERYTHROCYTE [DISTWIDTH] IN BLOOD BY AUTOMATED COUNT: 11.8 % (ref 11–15)
GLOBULIN SER CALC-MCNC: 2.5 G/DL (CALC) (ref 1.9–3.7)
HCT VFR BLD AUTO: 39.9 % (ref 35–45)
HGB BLD-MCNC: 13.3 G/DL (ref 11.7–15.5)
MCH RBC QN AUTO: 32.5 PG (ref 27–33)
MCHC RBC AUTO-ENTMCNC: 33.3 G/DL (ref 32–36)
MCV RBC AUTO: 97.6 FL (ref 80–100)
PLATELET # BLD AUTO: 198 THOUSAND/UL (ref 140–400)
PMV BLD REES-ECKER: 9.3 FL (ref 7.5–12.5)
PROT SERPL-MCNC: 6.6 G/DL (ref 6.1–8.1)
RBC # BLD AUTO: 4.09 MILLION/UL (ref 3.8–5.1)
T4 FREE SERPL-MCNC: 1.2 NG/DL (ref 0.8–1.8)
TSH SERPL-ACNC: 0.16 MIU/L (ref 0.4–4.5)
WBC # BLD AUTO: 6.4 THOUSAND/UL (ref 3.8–10.8)

## 2025-08-05 ENCOUNTER — RESULTS FOLLOW-UP (OUTPATIENT)
Dept: ENDOCRINOLOGY | Facility: CLINIC | Age: 85
End: 2025-08-05
Payer: MEDICARE

## 2025-08-05 PROBLEM — E04.2 MULTINODULAR GOITER: Status: ACTIVE | Noted: 2025-08-05

## 2025-08-05 RX ORDER — METHIMAZOLE 5 MG/1
TABLET ORAL
Qty: 90 TABLET | Refills: 1 | Status: SHIPPED | OUTPATIENT
Start: 2025-08-05

## 2025-08-06 NOTE — ASSESSMENT & PLAN NOTE
Patient does not desire to have a repeat thyroid ultrasound to assess for stability of thyroid nodules    For follow up in 6 months

## 2025-08-06 NOTE — RESULT ENCOUNTER NOTE
Please call: [phone] (H)    Labs have been reviewed.  The TSH value has improved but still remains suppressed.  Please increase Methimazole to 6 days per week.  All other values are within normal limits.  For follow up as scheduled with repeat labs.

## 2025-09-04 DIAGNOSIS — E87.6 HYPOKALEMIA: ICD-10-CM

## 2025-09-04 DIAGNOSIS — I10 BENIGN ESSENTIAL HYPERTENSION: ICD-10-CM

## 2025-09-04 RX ORDER — HYDROCHLOROTHIAZIDE 25 MG/1
50 TABLET ORAL DAILY
Qty: 180 TABLET | Refills: 0 | Status: SHIPPED | OUTPATIENT
Start: 2025-09-04

## 2025-09-04 RX ORDER — POTASSIUM CHLORIDE 20 MEQ/1
20 TABLET, EXTENDED RELEASE ORAL 2 TIMES DAILY
Qty: 180 TABLET | Refills: 0 | Status: SHIPPED | OUTPATIENT
Start: 2025-09-04 | End: 2026-03-03

## 2025-10-07 ENCOUNTER — APPOINTMENT (OUTPATIENT)
Dept: PRIMARY CARE | Facility: CLINIC | Age: 85
End: 2025-10-07
Payer: MEDICARE

## 2026-02-02 ENCOUNTER — APPOINTMENT (OUTPATIENT)
Dept: ENDOCRINOLOGY | Facility: CLINIC | Age: 86
End: 2026-02-02
Payer: MEDICARE

## 2026-06-08 ENCOUNTER — APPOINTMENT (OUTPATIENT)
Dept: PRIMARY CARE | Facility: CLINIC | Age: 86
End: 2026-06-08
Payer: MEDICARE